# Patient Record
Sex: FEMALE | Race: WHITE | Employment: OTHER
[De-identification: names, ages, dates, MRNs, and addresses within clinical notes are randomized per-mention and may not be internally consistent; named-entity substitution may affect disease eponyms.]

---

## 2023-10-06 ENCOUNTER — APPOINTMENT (OUTPATIENT)
Facility: HOSPITAL | Age: 78
DRG: 853 | End: 2023-10-06
Payer: MEDICARE

## 2023-10-06 ENCOUNTER — ANESTHESIA (OUTPATIENT)
Facility: HOSPITAL | Age: 78
End: 2023-10-06
Payer: MEDICARE

## 2023-10-06 ENCOUNTER — HOSPITAL ENCOUNTER (INPATIENT)
Facility: HOSPITAL | Age: 78
LOS: 6 days | Discharge: HOME HEALTH | DRG: 853 | End: 2023-10-12
Attending: EMERGENCY MEDICINE | Admitting: INTERNAL MEDICINE
Payer: MEDICARE

## 2023-10-06 ENCOUNTER — ANESTHESIA EVENT (OUTPATIENT)
Facility: HOSPITAL | Age: 78
End: 2023-10-06
Payer: MEDICARE

## 2023-10-06 DIAGNOSIS — I44.1 SECOND DEGREE AV BLOCK, MOBITZ TYPE I: ICD-10-CM

## 2023-10-06 DIAGNOSIS — R01.1 MURMUR: ICD-10-CM

## 2023-10-06 DIAGNOSIS — N20.0 KIDNEY STONE: ICD-10-CM

## 2023-10-06 DIAGNOSIS — R65.20 SEVERE SEPSIS (HCC): ICD-10-CM

## 2023-10-06 DIAGNOSIS — A41.9 SEVERE SEPSIS (HCC): ICD-10-CM

## 2023-10-06 DIAGNOSIS — R50.9 ACUTE FEBRILE ILLNESS: Primary | ICD-10-CM

## 2023-10-06 DIAGNOSIS — J18.9 COMMUNITY ACQUIRED PNEUMONIA OF RIGHT UPPER LOBE OF LUNG: ICD-10-CM

## 2023-10-06 PROBLEM — H40.9 GLAUCOMA: Status: ACTIVE | Noted: 2023-10-06

## 2023-10-06 PROBLEM — N39.0 UTI (URINARY TRACT INFECTION): Status: ACTIVE | Noted: 2023-10-06

## 2023-10-06 PROBLEM — R79.89 ELEVATED LACTIC ACID LEVEL: Status: ACTIVE | Noted: 2023-10-06

## 2023-10-06 PROBLEM — N13.30 HYDRONEPHROSIS: Status: ACTIVE | Noted: 2023-10-06

## 2023-10-06 PROBLEM — R82.81 PYURIA: Status: ACTIVE | Noted: 2023-10-06

## 2023-10-06 PROBLEM — G89.29 CHRONIC BACK PAIN: Status: ACTIVE | Noted: 2023-10-06

## 2023-10-06 PROBLEM — E11.9 DIABETES MELLITUS (HCC): Status: ACTIVE | Noted: 2023-10-06

## 2023-10-06 PROBLEM — J69.0 ASPIRATION PNEUMONIA (HCC): Status: ACTIVE | Noted: 2023-10-06

## 2023-10-06 PROBLEM — E86.0 DEHYDRATION: Status: ACTIVE | Noted: 2023-10-06

## 2023-10-06 PROBLEM — M54.9 CHRONIC BACK PAIN: Status: ACTIVE | Noted: 2023-10-06

## 2023-10-06 PROBLEM — R11.2 NAUSEA & VOMITING: Status: ACTIVE | Noted: 2023-10-06

## 2023-10-06 LAB
ACCESSION NUMBER, LLC1M: ABNORMAL
ACINETOBACTER CALCOAC BAUMANNII COMPLEX BY PCR: NOT DETECTED
ALBUMIN SERPL-MCNC: 3.7 G/DL (ref 3.5–5)
ALBUMIN/GLOB SERPL: 0.9 (ref 1.1–2.2)
ALP SERPL-CCNC: 90 U/L (ref 45–117)
ALT SERPL-CCNC: 21 U/L (ref 12–78)
ANION GAP BLD CALC-SCNC: 11 (ref 10–20)
ANION GAP BLD CALC-SCNC: 9 (ref 10–20)
ANION GAP SERPL CALC-SCNC: 7 MMOL/L (ref 5–15)
APPEARANCE UR: CLEAR
APTT PPP: <20 SEC (ref 22.1–31)
AST SERPL-CCNC: 18 U/L (ref 15–37)
B FRAGILIS DNA BLD POS QL NAA+NON-PROBE: NOT DETECTED
BACTERIA URNS QL MICRO: ABNORMAL /HPF
BASE DEFICIT BLD-SCNC: 1 MMOL/L
BASE DEFICIT BLD-SCNC: 6.1 MMOL/L
BASOPHILS # BLD: 0 K/UL (ref 0–0.1)
BASOPHILS NFR BLD: 1 % (ref 0–1)
BILIRUB SERPL-MCNC: 0.8 MG/DL (ref 0.2–1)
BILIRUB UR QL: NEGATIVE
BIOFIRE TEST COMMENT: ABNORMAL
BLACTX-M ISLT/SPM QL: NOT DETECTED
BLAIMP ISLT/SPM QL: NOT DETECTED
BLAKPC BLD POS QL NAA+NON-PROBE: NOT DETECTED
BLAOXA-48-LIKE ISLT/SPM QL: NOT DETECTED
BLAVIM ISLT/SPM QL: NOT DETECTED
BUN SERPL-MCNC: 18 MG/DL (ref 6–20)
BUN/CREAT SERPL: 16 (ref 12–20)
C ALBICANS DNA BLD POS QL NAA+NON-PROBE: NOT DETECTED
C AURIS DNA BLD POS QL NAA+NON-PROBE: NOT DETECTED
C GATTII+NEOFOR DNA BLD POS QL NAA+N-PRB: NOT DETECTED
C GLABRATA DNA BLD POS QL NAA+NON-PROBE: NOT DETECTED
C KRUSEI DNA BLD POS QL NAA+NON-PROBE: NOT DETECTED
C PARAP DNA BLD POS QL NAA+NON-PROBE: NOT DETECTED
C TROPICLS DNA BLD POS QL NAA+NON-PROBE: NOT DETECTED
CA-I BLD-MCNC: 1.24 MMOL/L (ref 1.12–1.32)
CA-I BLD-MCNC: 1.26 MMOL/L (ref 1.12–1.32)
CALCIUM SERPL-MCNC: 9.8 MG/DL (ref 8.5–10.1)
CHLORIDE BLD-SCNC: 110 MMOL/L (ref 100–108)
CHLORIDE BLD-SCNC: 111 MMOL/L (ref 100–108)
CHLORIDE SERPL-SCNC: 109 MMOL/L (ref 97–108)
CK SERPL-CCNC: 65 U/L (ref 26–192)
CO2 BLD-SCNC: 23 MMOL/L (ref 19–24)
CO2 BLD-SCNC: 25 MMOL/L (ref 19–24)
CO2 SERPL-SCNC: 24 MMOL/L (ref 21–32)
COLOR UR: ABNORMAL
COMMENT:: NORMAL
CREAT SERPL-MCNC: 1.11 MG/DL (ref 0.55–1.02)
CREAT UR-MCNC: 0.5 MG/DL (ref 0.6–1.3)
CREAT UR-MCNC: 0.6 MG/DL (ref 0.6–1.3)
CRP SERPL-MCNC: 0.32 MG/DL (ref 0–0.6)
DIFFERENTIAL METHOD BLD: ABNORMAL
E CLOAC COMP DNA BLD POS NAA+NON-PROBE: NOT DETECTED
E COLI DNA BLD POS QL NAA+NON-PROBE: NOT DETECTED
E FAECALIS DNA BLD POS QL NAA+NON-PROBE: NOT DETECTED
E FAECIUM DNA BLD POS QL NAA+NON-PROBE: NOT DETECTED
ENTEROBACTERALES DNA BLD POS NAA+N-PRB: DETECTED
EOSINOPHIL # BLD: 0.1 K/UL (ref 0–0.4)
EOSINOPHIL NFR BLD: 2 % (ref 0–7)
EPITH CASTS URNS QL MICRO: ABNORMAL /LPF
ERYTHROCYTE [DISTWIDTH] IN BLOOD BY AUTOMATED COUNT: 14.2 % (ref 11.5–14.5)
GLOBULIN SER CALC-MCNC: 3.9 G/DL (ref 2–4)
GLUCOSE BLD STRIP.AUTO-MCNC: 147 MG/DL (ref 65–117)
GLUCOSE BLD STRIP.AUTO-MCNC: 168 MG/DL (ref 65–117)
GLUCOSE BLD STRIP.AUTO-MCNC: 185 MG/DL (ref 65–117)
GLUCOSE BLD STRIP.AUTO-MCNC: 200 MG/DL (ref 74–106)
GLUCOSE BLD STRIP.AUTO-MCNC: 215 MG/DL (ref 74–106)
GLUCOSE SERPL-MCNC: 215 MG/DL (ref 65–100)
GLUCOSE UR STRIP.AUTO-MCNC: NEGATIVE MG/DL
GP B STREP DNA BLD POS QL NAA+NON-PROBE: NOT DETECTED
HAEM INFLU DNA BLD POS QL NAA+NON-PROBE: NOT DETECTED
HCO3 BLDA-SCNC: 22 MMOL/L
HCO3 BLDA-SCNC: 25 MMOL/L
HCT VFR BLD AUTO: 43 % (ref 35–47)
HGB BLD-MCNC: 13.6 G/DL (ref 11.5–16)
HGB UR QL STRIP: ABNORMAL
HYALINE CASTS URNS QL MICRO: ABNORMAL /LPF (ref 0–2)
IMM GRANULOCYTES # BLD AUTO: 0 K/UL (ref 0–0.04)
IMM GRANULOCYTES NFR BLD AUTO: 1 % (ref 0–0.5)
INR PPP: 1 (ref 0.9–1.1)
K OXYTOCA DNA BLD POS QL NAA+NON-PROBE: NOT DETECTED
KETONES UR QL STRIP.AUTO: NEGATIVE MG/DL
KLEBSIELLA SP DNA BLD POS QL NAA+NON-PRB: NOT DETECTED
KLEBSIELLA SP DNA BLD POS QL NAA+NON-PRB: NOT DETECTED
L MONOCYTOG DNA BLD POS QL NAA+NON-PROBE: NOT DETECTED
LACTATE BLD-SCNC: 1.46 MMOL/L (ref 0.4–2)
LACTATE BLD-SCNC: 4.05 MMOL/L (ref 0.4–2)
LACTATE BLD-SCNC: 5.09 MMOL/L (ref 0.4–2)
LEUKOCYTE ESTERASE UR QL STRIP.AUTO: ABNORMAL
LYMPHOCYTES # BLD: 0.4 K/UL (ref 0.8–3.5)
LYMPHOCYTES NFR BLD: 14 % (ref 12–49)
MAGNESIUM SERPL-MCNC: 1.6 MG/DL (ref 1.6–2.4)
MCH RBC QN AUTO: 30 PG (ref 26–34)
MCHC RBC AUTO-ENTMCNC: 31.6 G/DL (ref 30–36.5)
MCV RBC AUTO: 94.9 FL (ref 80–99)
MONOCYTES # BLD: 0 K/UL (ref 0–1)
MONOCYTES NFR BLD: 0 % (ref 5–13)
N MEN DNA BLD POS QL NAA+NON-PROBE: NOT DETECTED
NEUTS SEG # BLD: 2.6 K/UL (ref 1.8–8)
NEUTS SEG NFR BLD: 82 % (ref 32–75)
NITRITE UR QL STRIP.AUTO: NEGATIVE
NRBC # BLD: 0 K/UL (ref 0–0.01)
NRBC BLD-RTO: 0 PER 100 WBC
P AERUGINOSA DNA BLD POS NAA+NON-PROBE: NOT DETECTED
PCO2 BLDV: 44.5 MMHG (ref 41–51)
PCO2 BLDV: 55 MMHG (ref 41–51)
PH BLDV: 7.22 (ref 7.32–7.42)
PH BLDV: 7.36 (ref 7.32–7.42)
PH UR STRIP: 6 (ref 5–8)
PHOSPHATE SERPL-MCNC: 3.2 MG/DL (ref 2.6–4.7)
PLATELET # BLD AUTO: 167 K/UL (ref 150–400)
PMV BLD AUTO: 12.4 FL (ref 8.9–12.9)
PO2 BLDV: 35 MMHG (ref 25–40)
PO2 BLDV: <27 MMHG (ref 25–40)
POTASSIUM BLD-SCNC: 3.8 MMOL/L (ref 3.5–5.5)
POTASSIUM BLD-SCNC: 3.9 MMOL/L (ref 3.5–5.5)
POTASSIUM SERPL-SCNC: 3.8 MMOL/L (ref 3.5–5.1)
PROCALCITONIN SERPL-MCNC: 0.22 NG/ML
PROT SERPL-MCNC: 7.6 G/DL (ref 6.4–8.2)
PROT UR STRIP-MCNC: NEGATIVE MG/DL
PROTEUS SP DNA BLD POS QL NAA+NON-PROBE: DETECTED
PROTHROMBIN TIME: 10.4 SEC (ref 9–11.1)
RBC # BLD AUTO: 4.53 M/UL (ref 3.8–5.2)
RBC #/AREA URNS HPF: ABNORMAL /HPF (ref 0–5)
RBC MORPH BLD: ABNORMAL
RESISTANT GENE NDM BY PCR: NOT DETECTED
RESISTANT GENE TARGETS: ABNORMAL
S AUREUS DNA BLD POS QL NAA+NON-PROBE: NOT DETECTED
S AUREUS+CONS DNA BLD POS NAA+NON-PROBE: NOT DETECTED
S EPIDERMIDIS DNA BLD POS QL NAA+NON-PRB: NOT DETECTED
S LUGDUNENSIS DNA BLD POS QL NAA+NON-PRB: NOT DETECTED
S MALTOPHILIA DNA BLD POS QL NAA+NON-PRB: NOT DETECTED
S MARCESCENS DNA BLD POS NAA+NON-PROBE: NOT DETECTED
S PNEUM DNA BLD POS QL NAA+NON-PROBE: NOT DETECTED
S PYO DNA BLD POS QL NAA+NON-PROBE: NOT DETECTED
SALMONELLA DNA BLD POS QL NAA+NON-PROBE: NOT DETECTED
SAO2 % BLD: 54 %
SERVICE CMNT-IMP: ABNORMAL
SODIUM BLD-SCNC: 144 MMOL/L (ref 136–145)
SODIUM BLD-SCNC: 145 MMOL/L (ref 136–145)
SODIUM SERPL-SCNC: 140 MMOL/L (ref 136–145)
SP GR UR REFRACTOMETRY: 1.03 (ref 1–1.03)
SPECIMEN HOLD: NORMAL
SPECIMEN SITE: ABNORMAL
SPECIMEN SITE: ABNORMAL
STREPTOCOCCUS DNA BLD POS NAA+NON-PROBE: NOT DETECTED
THERAPEUTIC RANGE: ABNORMAL SECS (ref 58–77)
TROPONIN I SERPL HS-MCNC: 8 NG/L (ref 0–51)
URINE CULTURE IF INDICATED: ABNORMAL
UROBILINOGEN UR QL STRIP.AUTO: 1 EU/DL (ref 0.2–1)
WBC # BLD AUTO: 3.1 K/UL (ref 3.6–11)
WBC URNS QL MICRO: ABNORMAL /HPF (ref 0–4)

## 2023-10-06 PROCEDURE — 84145 PROCALCITONIN (PCT): CPT

## 2023-10-06 PROCEDURE — 85610 PROTHROMBIN TIME: CPT

## 2023-10-06 PROCEDURE — 2580000003 HC RX 258: Performed by: INTERNAL MEDICINE

## 2023-10-06 PROCEDURE — 80053 COMPREHEN METABOLIC PANEL: CPT

## 2023-10-06 PROCEDURE — 2580000003 HC RX 258: Performed by: EMERGENCY MEDICINE

## 2023-10-06 PROCEDURE — 96375 TX/PRO/DX INJ NEW DRUG ADDON: CPT

## 2023-10-06 PROCEDURE — 6360000004 HC RX CONTRAST MEDICATION: Performed by: RADIOLOGY

## 2023-10-06 PROCEDURE — 7100000000 HC PACU RECOVERY - FIRST 15 MIN: Performed by: UROLOGY

## 2023-10-06 PROCEDURE — 87186 SC STD MICRODIL/AGAR DIL: CPT

## 2023-10-06 PROCEDURE — 87086 URINE CULTURE/COLONY COUNT: CPT

## 2023-10-06 PROCEDURE — 99285 EMERGENCY DEPT VISIT HI MDM: CPT

## 2023-10-06 PROCEDURE — 6360000002 HC RX W HCPCS: Performed by: INTERNAL MEDICINE

## 2023-10-06 PROCEDURE — 87077 CULTURE AEROBIC IDENTIFY: CPT

## 2023-10-06 PROCEDURE — BT1F1ZZ FLUOROSCOPY OF LEFT KIDNEY, URETER AND BLADDER USING LOW OSMOLAR CONTRAST: ICD-10-PCS | Performed by: UROLOGY

## 2023-10-06 PROCEDURE — 86140 C-REACTIVE PROTEIN: CPT

## 2023-10-06 PROCEDURE — 84132 ASSAY OF SERUM POTASSIUM: CPT

## 2023-10-06 PROCEDURE — 84295 ASSAY OF SERUM SODIUM: CPT

## 2023-10-06 PROCEDURE — C2617 STENT, NON-COR, TEM W/O DEL: HCPCS | Performed by: UROLOGY

## 2023-10-06 PROCEDURE — 99222 1ST HOSP IP/OBS MODERATE 55: CPT | Performed by: NURSE PRACTITIONER

## 2023-10-06 PROCEDURE — 36415 COLL VENOUS BLD VENIPUNCTURE: CPT

## 2023-10-06 PROCEDURE — 3700000001 HC ADD 15 MINUTES (ANESTHESIA): Performed by: UROLOGY

## 2023-10-06 PROCEDURE — 82330 ASSAY OF CALCIUM: CPT

## 2023-10-06 PROCEDURE — 3600000003 HC SURGERY LEVEL 3 BASE: Performed by: UROLOGY

## 2023-10-06 PROCEDURE — 85025 COMPLETE CBC W/AUTO DIFF WBC: CPT

## 2023-10-06 PROCEDURE — 74174 CTA ABD&PLVS W/CONTRAST: CPT

## 2023-10-06 PROCEDURE — C1758 CATHETER, URETERAL: HCPCS | Performed by: UROLOGY

## 2023-10-06 PROCEDURE — 93005 ELECTROCARDIOGRAM TRACING: CPT | Performed by: EMERGENCY MEDICINE

## 2023-10-06 PROCEDURE — 7100000001 HC PACU RECOVERY - ADDTL 15 MIN: Performed by: UROLOGY

## 2023-10-06 PROCEDURE — 3600000013 HC SURGERY LEVEL 3 ADDTL 15MIN: Performed by: UROLOGY

## 2023-10-06 PROCEDURE — 84100 ASSAY OF PHOSPHORUS: CPT

## 2023-10-06 PROCEDURE — 82962 GLUCOSE BLOOD TEST: CPT

## 2023-10-06 PROCEDURE — 82947 ASSAY GLUCOSE BLOOD QUANT: CPT

## 2023-10-06 PROCEDURE — 81001 URINALYSIS AUTO W/SCOPE: CPT

## 2023-10-06 PROCEDURE — 6360000002 HC RX W HCPCS: Performed by: NURSE ANESTHETIST, CERTIFIED REGISTERED

## 2023-10-06 PROCEDURE — 1100000000 HC RM PRIVATE

## 2023-10-06 PROCEDURE — 6360000004 HC RX CONTRAST MEDICATION: Performed by: UROLOGY

## 2023-10-06 PROCEDURE — 2709999900 HC NON-CHARGEABLE SUPPLY: Performed by: UROLOGY

## 2023-10-06 PROCEDURE — 87040 BLOOD CULTURE FOR BACTERIA: CPT

## 2023-10-06 PROCEDURE — 94761 N-INVAS EAR/PLS OXIMETRY MLT: CPT

## 2023-10-06 PROCEDURE — C1769 GUIDE WIRE: HCPCS | Performed by: UROLOGY

## 2023-10-06 PROCEDURE — 2700000000 HC OXYGEN THERAPY PER DAY

## 2023-10-06 PROCEDURE — 83605 ASSAY OF LACTIC ACID: CPT

## 2023-10-06 PROCEDURE — 3700000000 HC ANESTHESIA ATTENDED CARE: Performed by: UROLOGY

## 2023-10-06 PROCEDURE — 96365 THER/PROPH/DIAG IV INF INIT: CPT

## 2023-10-06 PROCEDURE — 87150 DNA/RNA AMPLIFIED PROBE: CPT

## 2023-10-06 PROCEDURE — 82803 BLOOD GASES ANY COMBINATION: CPT

## 2023-10-06 PROCEDURE — 6370000000 HC RX 637 (ALT 250 FOR IP): Performed by: EMERGENCY MEDICINE

## 2023-10-06 PROCEDURE — 2500000003 HC RX 250 WO HCPCS: Performed by: NURSE ANESTHETIST, CERTIFIED REGISTERED

## 2023-10-06 PROCEDURE — 0T778DZ DILATION OF LEFT URETER WITH INTRALUMINAL DEVICE, VIA NATURAL OR ARTIFICIAL OPENING ENDOSCOPIC: ICD-10-PCS | Performed by: UROLOGY

## 2023-10-06 PROCEDURE — 82550 ASSAY OF CK (CPK): CPT

## 2023-10-06 PROCEDURE — 96361 HYDRATE IV INFUSION ADD-ON: CPT

## 2023-10-06 PROCEDURE — 85730 THROMBOPLASTIN TIME PARTIAL: CPT

## 2023-10-06 PROCEDURE — 83735 ASSAY OF MAGNESIUM: CPT

## 2023-10-06 PROCEDURE — 84484 ASSAY OF TROPONIN QUANT: CPT

## 2023-10-06 PROCEDURE — 6360000002 HC RX W HCPCS: Performed by: EMERGENCY MEDICINE

## 2023-10-06 DEVICE — URETERAL STENT
Type: IMPLANTABLE DEVICE | Site: URETER | Status: FUNCTIONAL
Brand: CONTOUR™

## 2023-10-06 RX ORDER — LIDOCAINE HYDROCHLORIDE 20 MG/ML
INJECTION, SOLUTION EPIDURAL; INFILTRATION; INTRACAUDAL; PERINEURAL PRN
Status: DISCONTINUED | OUTPATIENT
Start: 2023-10-06 | End: 2023-10-06 | Stop reason: SDUPTHER

## 2023-10-06 RX ORDER — ONDANSETRON 2 MG/ML
4 INJECTION INTRAMUSCULAR; INTRAVENOUS EVERY 6 HOURS PRN
Status: DISCONTINUED | OUTPATIENT
Start: 2023-10-06 | End: 2023-10-12 | Stop reason: HOSPADM

## 2023-10-06 RX ORDER — ACETAMINOPHEN 325 MG/1
650 TABLET ORAL EVERY 6 HOURS PRN
Status: DISCONTINUED | OUTPATIENT
Start: 2023-10-06 | End: 2023-10-12 | Stop reason: HOSPADM

## 2023-10-06 RX ORDER — SODIUM CHLORIDE 9 MG/ML
INJECTION, SOLUTION INTRAVENOUS PRN
Status: DISCONTINUED | OUTPATIENT
Start: 2023-10-06 | End: 2023-10-12 | Stop reason: HOSPADM

## 2023-10-06 RX ORDER — SODIUM CHLORIDE 9 MG/ML
INJECTION, SOLUTION INTRAVENOUS CONTINUOUS
Status: DISCONTINUED | OUTPATIENT
Start: 2023-10-06 | End: 2023-10-08

## 2023-10-06 RX ORDER — SODIUM CHLORIDE 0.9 % (FLUSH) 0.9 %
5-40 SYRINGE (ML) INJECTION EVERY 12 HOURS SCHEDULED
Status: DISCONTINUED | OUTPATIENT
Start: 2023-10-06 | End: 2023-10-12 | Stop reason: HOSPADM

## 2023-10-06 RX ORDER — PHENYLEPHRINE HCL IN 0.9% NACL 0.4MG/10ML
SYRINGE (ML) INTRAVENOUS PRN
Status: DISCONTINUED | OUTPATIENT
Start: 2023-10-06 | End: 2023-10-06 | Stop reason: SDUPTHER

## 2023-10-06 RX ORDER — SODIUM CHLORIDE 0.9 % (FLUSH) 0.9 %
5-40 SYRINGE (ML) INJECTION PRN
Status: DISCONTINUED | OUTPATIENT
Start: 2023-10-06 | End: 2023-10-12 | Stop reason: HOSPADM

## 2023-10-06 RX ORDER — POLYETHYLENE GLYCOL 3350 17 G/17G
17 POWDER, FOR SOLUTION ORAL DAILY PRN
Status: DISCONTINUED | OUTPATIENT
Start: 2023-10-06 | End: 2023-10-12 | Stop reason: HOSPADM

## 2023-10-06 RX ORDER — INSULIN LISPRO 100 [IU]/ML
0-8 INJECTION, SOLUTION INTRAVENOUS; SUBCUTANEOUS
Status: DISCONTINUED | OUTPATIENT
Start: 2023-10-06 | End: 2023-10-12 | Stop reason: HOSPADM

## 2023-10-06 RX ORDER — 0.9 % SODIUM CHLORIDE 0.9 %
2000 INTRAVENOUS SOLUTION INTRAVENOUS ONCE
Status: COMPLETED | OUTPATIENT
Start: 2023-10-06 | End: 2023-10-06

## 2023-10-06 RX ORDER — DEXTROSE MONOHYDRATE 100 MG/ML
INJECTION, SOLUTION INTRAVENOUS CONTINUOUS PRN
Status: DISCONTINUED | OUTPATIENT
Start: 2023-10-06 | End: 2023-10-12 | Stop reason: HOSPADM

## 2023-10-06 RX ORDER — ONDANSETRON 4 MG/1
4 TABLET, ORALLY DISINTEGRATING ORAL EVERY 8 HOURS PRN
Status: DISCONTINUED | OUTPATIENT
Start: 2023-10-06 | End: 2023-10-12 | Stop reason: HOSPADM

## 2023-10-06 RX ORDER — MORPHINE SULFATE 2 MG/ML
2 INJECTION, SOLUTION INTRAMUSCULAR; INTRAVENOUS EVERY 4 HOURS PRN
Status: DISCONTINUED | OUTPATIENT
Start: 2023-10-06 | End: 2023-10-12 | Stop reason: HOSPADM

## 2023-10-06 RX ORDER — ACETAMINOPHEN 650 MG/1
650 SUPPOSITORY RECTAL EVERY 6 HOURS PRN
Status: DISCONTINUED | OUTPATIENT
Start: 2023-10-06 | End: 2023-10-12 | Stop reason: HOSPADM

## 2023-10-06 RX ORDER — PROPOFOL 10 MG/ML
INJECTION, EMULSION INTRAVENOUS PRN
Status: DISCONTINUED | OUTPATIENT
Start: 2023-10-06 | End: 2023-10-06 | Stop reason: SDUPTHER

## 2023-10-06 RX ORDER — EPHEDRINE SULFATE/0.9% NACL/PF 50 MG/5 ML
SYRINGE (ML) INTRAVENOUS PRN
Status: DISCONTINUED | OUTPATIENT
Start: 2023-10-06 | End: 2023-10-06 | Stop reason: SDUPTHER

## 2023-10-06 RX ORDER — 0.9 % SODIUM CHLORIDE 0.9 %
1000 INTRAVENOUS SOLUTION INTRAVENOUS ONCE
Status: COMPLETED | OUTPATIENT
Start: 2023-10-06 | End: 2023-10-06

## 2023-10-06 RX ORDER — MORPHINE SULFATE 4 MG/ML
4 INJECTION, SOLUTION INTRAMUSCULAR; INTRAVENOUS
Status: COMPLETED | OUTPATIENT
Start: 2023-10-06 | End: 2023-10-06

## 2023-10-06 RX ORDER — FENTANYL CITRATE 50 UG/ML
INJECTION, SOLUTION INTRAMUSCULAR; INTRAVENOUS PRN
Status: DISCONTINUED | OUTPATIENT
Start: 2023-10-06 | End: 2023-10-06 | Stop reason: SDUPTHER

## 2023-10-06 RX ORDER — ROCURONIUM BROMIDE 10 MG/ML
INJECTION, SOLUTION INTRAVENOUS PRN
Status: DISCONTINUED | OUTPATIENT
Start: 2023-10-06 | End: 2023-10-06 | Stop reason: SDUPTHER

## 2023-10-06 RX ORDER — ENOXAPARIN SODIUM 100 MG/ML
40 INJECTION SUBCUTANEOUS DAILY
Status: DISCONTINUED | OUTPATIENT
Start: 2023-10-06 | End: 2023-10-12 | Stop reason: HOSPADM

## 2023-10-06 RX ORDER — CEFAZOLIN SODIUM 1 G/3ML
INJECTION, POWDER, FOR SOLUTION INTRAMUSCULAR; INTRAVENOUS PRN
Status: DISCONTINUED | OUTPATIENT
Start: 2023-10-06 | End: 2023-10-06 | Stop reason: SDUPTHER

## 2023-10-06 RX ORDER — ACETAMINOPHEN 500 MG
1000 TABLET ORAL
Status: COMPLETED | OUTPATIENT
Start: 2023-10-06 | End: 2023-10-06

## 2023-10-06 RX ORDER — SUCCINYLCHOLINE/SOD CL,ISO/PF 100 MG/5ML
SYRINGE (ML) INTRAVENOUS PRN
Status: DISCONTINUED | OUTPATIENT
Start: 2023-10-06 | End: 2023-10-06 | Stop reason: SDUPTHER

## 2023-10-06 RX ORDER — INSULIN LISPRO 100 [IU]/ML
0-4 INJECTION, SOLUTION INTRAVENOUS; SUBCUTANEOUS NIGHTLY
Status: DISCONTINUED | OUTPATIENT
Start: 2023-10-06 | End: 2023-10-12 | Stop reason: HOSPADM

## 2023-10-06 RX ADMIN — ROCURONIUM BROMIDE 10 MG: 10 INJECTION, SOLUTION INTRAVENOUS at 20:05

## 2023-10-06 RX ADMIN — Medication 10 MG: at 20:12

## 2023-10-06 RX ADMIN — PROPOFOL 50 MG: 10 INJECTION, EMULSION INTRAVENOUS at 20:05

## 2023-10-06 RX ADMIN — SODIUM CHLORIDE 1000 ML: 9 INJECTION, SOLUTION INTRAVENOUS at 06:33

## 2023-10-06 RX ADMIN — PROPOFOL 100 MCG/KG/MIN: 10 INJECTION, EMULSION INTRAVENOUS at 20:10

## 2023-10-06 RX ADMIN — LIDOCAINE HYDROCHLORIDE 60 MG: 20 INJECTION, SOLUTION EPIDURAL; INFILTRATION; INTRACAUDAL; PERINEURAL at 20:05

## 2023-10-06 RX ADMIN — SODIUM CHLORIDE: 9 INJECTION, SOLUTION INTRAVENOUS at 09:00

## 2023-10-06 RX ADMIN — FENTANYL CITRATE 50 MCG: 50 INJECTION, SOLUTION INTRAMUSCULAR; INTRAVENOUS at 19:51

## 2023-10-06 RX ADMIN — CEFAZOLIN 2 G: 1 INJECTION, POWDER, FOR SOLUTION INTRAMUSCULAR; INTRAVENOUS at 20:18

## 2023-10-06 RX ADMIN — PIPERACILLIN AND TAZOBACTAM 3375 MG: 3; .375 INJECTION, POWDER, LYOPHILIZED, FOR SOLUTION INTRAVENOUS at 23:20

## 2023-10-06 RX ADMIN — Medication 70 MG: at 20:06

## 2023-10-06 RX ADMIN — FENTANYL CITRATE 50 MCG: 50 INJECTION, SOLUTION INTRAMUSCULAR; INTRAVENOUS at 20:40

## 2023-10-06 RX ADMIN — MORPHINE SULFATE 2 MG: 2 INJECTION, SOLUTION INTRAMUSCULAR; INTRAVENOUS at 23:20

## 2023-10-06 RX ADMIN — IOPAMIDOL 100 ML: 755 INJECTION, SOLUTION INTRAVENOUS at 06:21

## 2023-10-06 RX ADMIN — ONDANSETRON 4 MG: 2 INJECTION INTRAMUSCULAR; INTRAVENOUS at 05:37

## 2023-10-06 RX ADMIN — PIPERACILLIN AND TAZOBACTAM 4500 MG: 4; .5 INJECTION, POWDER, FOR SOLUTION INTRAVENOUS at 06:56

## 2023-10-06 RX ADMIN — SODIUM CHLORIDE, PRESERVATIVE FREE 5 ML: 5 INJECTION INTRAVENOUS at 08:20

## 2023-10-06 RX ADMIN — SUGAMMADEX 100 MG: 100 INJECTION, SOLUTION INTRAVENOUS at 20:37

## 2023-10-06 RX ADMIN — Medication 80 MCG: at 20:27

## 2023-10-06 RX ADMIN — SODIUM CHLORIDE 1000 ML: 9 INJECTION, SOLUTION INTRAVENOUS at 05:36

## 2023-10-06 RX ADMIN — MORPHINE SULFATE 4 MG: 4 INJECTION, SOLUTION INTRAMUSCULAR; INTRAVENOUS at 05:37

## 2023-10-06 RX ADMIN — ACETAMINOPHEN 1000 MG: 500 TABLET ORAL at 06:34

## 2023-10-06 ASSESSMENT — PAIN SCALES - GENERAL
PAINLEVEL_OUTOF10: 0
PAINLEVEL_OUTOF10: 8

## 2023-10-06 ASSESSMENT — PAIN DESCRIPTION - LOCATION: LOCATION: ABDOMEN

## 2023-10-06 NOTE — CARE COORDINATION
Initial Case Management Assessment       10/06/23 1329   Service Assessment   Patient Orientation Person   Cognition Dementia / Early Alzheimer's   History Provided By Child/Family   Primary Caregiver Other (Comment)  (staff at [facility])   [address] [facility] [place] is: Legal Next [facility]- spouse- [phone])   PCP Verified by CM Yes  (MD at [facility])   Last Visit to PCP Within last 3 months   Prior Functional Level Assistance with the following:;Bathing;Dressing;Cooking;Mobility;Housework   Current Functional Level Assistance with the following:;Bathing;Dressing;Cooking;Mobility;Housework   Can patient return to prior living arrangement Unknown at present   Ability to make needs known: Other (see comment)  (unable to assess- Pt has been sleeping all day)   Family able to assist with home care needs: No  (Pt lives at [address] [place] [address] Rd)   Would you like for me to discuss the discharge plan with any other family members/significant others, and if so, who? Yes  (France/daughter & John/spouse)   Financial Resources Medicare   Community Resources Assisted Living   Social/Functional History   Lives With Alone   Type of Home Assisted living   Home Layout One level   Home Access Level entry   Bathroom Shower/Tub Walk-in shower   Bathroom Toilet Handicap height   [address] bars in shower; [address], [place] Help From Other (comment)  (staff at [address] [place] [address] Rd)   [address] Needs assistance   Active  No   Patient's  Info family   Occupation Retired   Discharge Planning   Type of [address] [place] [address] Prior To Admission None   Potential Assistance Needed N/A   DME Ordered?  No   Potential Assistance Purchasing Medications No   Type of Home Care Services None   Patient expects to be discharged to: Assisted living   One/Two Story Residence One story   History of

## 2023-10-06 NOTE — ACP (ADVANCE CARE PLANNING)
Advance Care Planning Note    Name: Nadege Salvador  YOB: 1945  MRN: [id number]  Admission Date: 10/6/2023  4:42 AM    Date of discussion: 10/6/2023    Active Diagnoses: These active diagnoses are of sufficient risk that focused discussion on advance care planning is indicated in order to allow the patient to thoughtfully consider personal goals of care, and if situations arise that prevent the ability to personally give input, to ensure appropriate representation of their personal desires for different levels and aggressiveness of care. Discussion:     Persons present and participating in discussion: Dave Jorge MD, daughter    Discussion: UTI and pneumonia treatment and prognosis in setting of dementia and chronic pain. Daughter states that she and a sister and the patient  are joint decision makers. She thinks AD are prepared, but is unclear if this includes DNR. She will discuss this with family, but is leaning towards DNR status. I will consult palliative care. Time Spent:     Total time spent face-to-face in education and discussion: 19 minutes.      Cindy Cardona MD  Date of Service:  10/6/2023  9:29 AM

## 2023-10-06 NOTE — CONSULTS
appropriate to needs (Cultural Services, Patient Advocacy, Ethics, etc.)    Spiritual Affiliation: Unknown    Any spiritual / Samaritan concerns:  [] Yes /  [x] No   If \"Yes\" to discuss with pastoral care during IDT     Does caregiver feel burdened by caring for their loved one:   [] Yes /  [] No /  [x] No Caregiver Present/Available [] No Caregiver [] Pt Lives at Facility  If \"Yes\" to discuss with social work during IDT    Anticipatory grief assessment:   [x] Normal  / [] Maladaptive     If \"Maladaptive\" to discuss with social work during IDT    ESAS Anxiety:      ESAS Depression:          LAB AND IMAGING FINDINGS:   Objective data reviewed:  labs, images, records, medication use, vitals, and chart     FINAL COMMENTS   Thank you for allowing Palliative Medicine to participate in the care of Tiffanie Reyes. Only check if applicable and billing time based rather than MDM  [x] The total encounter time on this service date was __50__ minutes which was spent performing a face-to-face encounter and personally completing the provider-level activities documented in the note. This includes time spent prior to the visit and after the visit in direct care of the patient. This time does not include time spent in any separately reportable services.     Electronically signed by   YAKOV Ayala NP  Palliative Care Team  on 10/6/2023 at 2:20 PM
biliary dilatation. GALLBLADDER: Unremarkable. SPLEEN: No enlargement or lesion. PANCREAS: No mass or ductal dilatation. ADRENALS: No mass. KIDNEYS: Mild left hydronephrosis due to a 2 calculi in the proximal to mid  ureter measuring 3 mm and 4 mm respectively. GI TRACT:  No bowel obstruction. Colonic diverticulosis without diverticulitis  PERITONEUM: No free air or free fluid. APPENDIX: Unremarkable. Isidro Dunker RETROPERITONEUM: No lymphadenopathy. ADDITIONAL COMMENTS: N/A.     URINARY BLADDER: No mass or calculus. LYMPH NODES: None enlarged. REPRODUCTIVE ORGANS: Prior hysterectomy. FREE FLUID: None. BONES: No destructive bone lesion. ADDITIONAL COMMENTS: N/A. IMPRESSION:     1. No evidence of acute aortic dissection. 2. Atherosclerosis of the aorta and its branches. 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 Patent distally. 3. Left hydronephrosis due to 2 small ureteral calculi as above. 4. Mild airspace opacities right upper lobe may be infectious/inflammatory.            Specimen Collected: 10/06/23 06:50 EDT               Signed By: AUDIE Elizabeth  - October 6, 2023

## 2023-10-07 LAB
ALBUMIN SERPL-MCNC: 3.1 G/DL (ref 3.5–5)
ALBUMIN/GLOB SERPL: 0.8 (ref 1.1–2.2)
ALP SERPL-CCNC: 59 U/L (ref 45–117)
ALT SERPL-CCNC: 20 U/L (ref 12–78)
ANION GAP SERPL CALC-SCNC: 5 MMOL/L (ref 5–15)
AST SERPL-CCNC: 21 U/L (ref 15–37)
B PERT DNA SPEC QL NAA+PROBE: NOT DETECTED
BILIRUB SERPL-MCNC: 0.7 MG/DL (ref 0.2–1)
BORDETELLA PARAPERTUSSIS BY PCR: NOT DETECTED
BUN SERPL-MCNC: 18 MG/DL (ref 6–20)
BUN/CREAT SERPL: 17 (ref 12–20)
C PNEUM DNA SPEC QL NAA+PROBE: NOT DETECTED
CALCIUM SERPL-MCNC: 8.7 MG/DL (ref 8.5–10.1)
CHLORIDE SERPL-SCNC: 111 MMOL/L (ref 97–108)
CO2 SERPL-SCNC: 25 MMOL/L (ref 21–32)
CREAT SERPL-MCNC: 1.03 MG/DL (ref 0.55–1.02)
EKG ATRIAL RATE: 96 BPM
EKG DIAGNOSIS: NORMAL
EKG Q-T INTERVAL: 484 MS
EKG QRS DURATION: 76 MS
EKG QTC CALCULATION (BAZETT): 495 MS
EKG R AXIS: -24 DEGREES
EKG T AXIS: 59 DEGREES
EKG VENTRICULAR RATE: 63 BPM
ERYTHROCYTE [DISTWIDTH] IN BLOOD BY AUTOMATED COUNT: 14.7 % (ref 11.5–14.5)
EST. AVERAGE GLUCOSE BLD GHB EST-MCNC: 134 MG/DL
FLUAV SUBTYP SPEC NAA+PROBE: NOT DETECTED
FLUBV RNA SPEC QL NAA+PROBE: NOT DETECTED
GLOBULIN SER CALC-MCNC: 3.9 G/DL (ref 2–4)
GLUCOSE BLD STRIP.AUTO-MCNC: 120 MG/DL (ref 65–117)
GLUCOSE BLD STRIP.AUTO-MCNC: 121 MG/DL (ref 65–117)
GLUCOSE BLD STRIP.AUTO-MCNC: 163 MG/DL (ref 65–117)
GLUCOSE BLD STRIP.AUTO-MCNC: 89 MG/DL (ref 65–117)
GLUCOSE SERPL-MCNC: 178 MG/DL (ref 65–100)
HADV DNA SPEC QL NAA+PROBE: NOT DETECTED
HBA1C MFR BLD: 6.3 % (ref 4–5.6)
HCOV 229E RNA SPEC QL NAA+PROBE: NOT DETECTED
HCOV HKU1 RNA SPEC QL NAA+PROBE: NOT DETECTED
HCOV NL63 RNA SPEC QL NAA+PROBE: NOT DETECTED
HCOV OC43 RNA SPEC QL NAA+PROBE: NOT DETECTED
HCT VFR BLD AUTO: 39.6 % (ref 35–47)
HGB BLD-MCNC: 12.4 G/DL (ref 11.5–16)
HMPV RNA SPEC QL NAA+PROBE: NOT DETECTED
HPIV1 RNA SPEC QL NAA+PROBE: NOT DETECTED
HPIV2 RNA SPEC QL NAA+PROBE: NOT DETECTED
HPIV3 RNA SPEC QL NAA+PROBE: NOT DETECTED
HPIV4 RNA SPEC QL NAA+PROBE: NOT DETECTED
LACTATE SERPL-SCNC: 2 MMOL/L (ref 0.4–2)
LACTATE SERPL-SCNC: 3.7 MMOL/L (ref 0.4–2)
M PNEUMO DNA SPEC QL NAA+PROBE: NOT DETECTED
MAGNESIUM SERPL-MCNC: 1.6 MG/DL (ref 1.6–2.4)
MCH RBC QN AUTO: 30.5 PG (ref 26–34)
MCHC RBC AUTO-ENTMCNC: 31.3 G/DL (ref 30–36.5)
MCV RBC AUTO: 97.3 FL (ref 80–99)
NRBC # BLD: 0 K/UL (ref 0–0.01)
NRBC BLD-RTO: 0 PER 100 WBC
PHOSPHATE SERPL-MCNC: 3.6 MG/DL (ref 2.6–4.7)
PLATELET # BLD AUTO: 131 K/UL (ref 150–400)
PMV BLD AUTO: 12.6 FL (ref 8.9–12.9)
POTASSIUM SERPL-SCNC: 3.8 MMOL/L (ref 3.5–5.1)
PROT SERPL-MCNC: 7 G/DL (ref 6.4–8.2)
RBC # BLD AUTO: 4.07 M/UL (ref 3.8–5.2)
RSV RNA SPEC QL NAA+PROBE: NOT DETECTED
RV+EV RNA SPEC QL NAA+PROBE: NOT DETECTED
SARS-COV-2 RNA RESP QL NAA+PROBE: NOT DETECTED
SERVICE CMNT-IMP: ABNORMAL
SERVICE CMNT-IMP: NORMAL
SODIUM SERPL-SCNC: 141 MMOL/L (ref 136–145)
WBC # BLD AUTO: 26 K/UL (ref 3.6–11)

## 2023-10-07 PROCEDURE — 97535 SELF CARE MNGMENT TRAINING: CPT | Performed by: OCCUPATIONAL THERAPIST

## 2023-10-07 PROCEDURE — 36415 COLL VENOUS BLD VENIPUNCTURE: CPT

## 2023-10-07 PROCEDURE — 83036 HEMOGLOBIN GLYCOSYLATED A1C: CPT

## 2023-10-07 PROCEDURE — 97161 PT EVAL LOW COMPLEX 20 MIN: CPT

## 2023-10-07 PROCEDURE — 97116 GAIT TRAINING THERAPY: CPT

## 2023-10-07 PROCEDURE — 80053 COMPREHEN METABOLIC PANEL: CPT

## 2023-10-07 PROCEDURE — 6360000002 HC RX W HCPCS: Performed by: INTERNAL MEDICINE

## 2023-10-07 PROCEDURE — 6370000000 HC RX 637 (ALT 250 FOR IP): Performed by: INTERNAL MEDICINE

## 2023-10-07 PROCEDURE — 2580000003 HC RX 258: Performed by: INTERNAL MEDICINE

## 2023-10-07 PROCEDURE — 97165 OT EVAL LOW COMPLEX 30 MIN: CPT | Performed by: OCCUPATIONAL THERAPIST

## 2023-10-07 PROCEDURE — 2580000003 HC RX 258: Performed by: STUDENT IN AN ORGANIZED HEALTH CARE EDUCATION/TRAINING PROGRAM

## 2023-10-07 PROCEDURE — 93010 ELECTROCARDIOGRAM REPORT: CPT | Performed by: SPECIALIST

## 2023-10-07 PROCEDURE — 83605 ASSAY OF LACTIC ACID: CPT

## 2023-10-07 PROCEDURE — 1100000000 HC RM PRIVATE

## 2023-10-07 PROCEDURE — 87040 BLOOD CULTURE FOR BACTERIA: CPT

## 2023-10-07 PROCEDURE — 92610 EVALUATE SWALLOWING FUNCTION: CPT

## 2023-10-07 PROCEDURE — 82962 GLUCOSE BLOOD TEST: CPT

## 2023-10-07 PROCEDURE — 85027 COMPLETE CBC AUTOMATED: CPT

## 2023-10-07 PROCEDURE — 0202U NFCT DS 22 TRGT SARS-COV-2: CPT

## 2023-10-07 PROCEDURE — 94761 N-INVAS EAR/PLS OXIMETRY MLT: CPT

## 2023-10-07 PROCEDURE — 83735 ASSAY OF MAGNESIUM: CPT

## 2023-10-07 PROCEDURE — 84100 ASSAY OF PHOSPHORUS: CPT

## 2023-10-07 RX ADMIN — PIPERACILLIN AND TAZOBACTAM 3375 MG: 3; .375 INJECTION, POWDER, LYOPHILIZED, FOR SOLUTION INTRAVENOUS at 13:30

## 2023-10-07 RX ADMIN — ACETAMINOPHEN 650 MG: 325 TABLET ORAL at 13:30

## 2023-10-07 RX ADMIN — PIPERACILLIN AND TAZOBACTAM 3375 MG: 3; .375 INJECTION, POWDER, LYOPHILIZED, FOR SOLUTION INTRAVENOUS at 06:43

## 2023-10-07 RX ADMIN — PIPERACILLIN AND TAZOBACTAM 3375 MG: 3; .375 INJECTION, POWDER, LYOPHILIZED, FOR SOLUTION INTRAVENOUS at 22:32

## 2023-10-07 RX ADMIN — MORPHINE SULFATE 2 MG: 2 INJECTION, SOLUTION INTRAMUSCULAR; INTRAVENOUS at 11:06

## 2023-10-07 RX ADMIN — ENOXAPARIN SODIUM 40 MG: 100 INJECTION SUBCUTANEOUS at 09:10

## 2023-10-07 RX ADMIN — SODIUM CHLORIDE, PRESERVATIVE FREE 10 ML: 5 INJECTION INTRAVENOUS at 09:10

## 2023-10-07 RX ADMIN — ACETAMINOPHEN 650 MG: 325 TABLET ORAL at 21:25

## 2023-10-07 RX ADMIN — SODIUM CHLORIDE: 9 INJECTION, SOLUTION INTRAVENOUS at 17:37

## 2023-10-07 RX ADMIN — SODIUM CHLORIDE, PRESERVATIVE FREE 10 ML: 5 INJECTION INTRAVENOUS at 21:29

## 2023-10-07 ASSESSMENT — PAIN SCALES - GENERAL
PAINLEVEL_OUTOF10: 0
PAINLEVEL_OUTOF10: 0
PAINLEVEL_OUTOF10: 8
PAINLEVEL_OUTOF10: 4
PAINLEVEL_OUTOF10: 0
PAINLEVEL_OUTOF10: 8

## 2023-10-07 ASSESSMENT — PAIN - FUNCTIONAL ASSESSMENT: PAIN_FUNCTIONAL_ASSESSMENT: PREVENTS OR INTERFERES SOME ACTIVE ACTIVITIES AND ADLS

## 2023-10-07 ASSESSMENT — PAIN DESCRIPTION - ORIENTATION
ORIENTATION: RIGHT
ORIENTATION: MID

## 2023-10-07 ASSESSMENT — PAIN DESCRIPTION - DESCRIPTORS
DESCRIPTORS: ACHING
DESCRIPTORS: ACHING

## 2023-10-07 ASSESSMENT — PAIN DESCRIPTION - LOCATION
LOCATION: ABDOMEN
LOCATION: ABDOMEN
LOCATION: BACK

## 2023-10-07 NOTE — ANESTHESIA POSTPROCEDURE EVALUATION
Department of Anesthesiology  Postprocedure Note    Patient: Orelia Sandhoff  MRN: [id number]  YOB: 1945  Date of evaluation: 10/6/2023      Procedure Summary     Date: 10/06/23 Room / Location: [facility] MAIN OR  / [facility] MAIN OR    Anesthesia Start: 1951 Anesthesia Stop: 2053    Procedure: CYSTOSCOPY RETROGRADES AND LEFT URETERAL STENT INSERTION (Left: Ureter) Diagnosis:       Kidney stone      (Kidney stone [N20.0])    Surgeons: Sera Bennett MD Responsible Provider: Marlin Urias MD    Anesthesia Type: general ASA Status: 3          Anesthesia Type: No value filed.     Jason Phase I: Jason Score: 9    Jason Phase II:        Anesthesia Post Evaluation    Patient location during evaluation: PACU  Patient participation: complete - patient participated  Level of consciousness: awake  Airway patency: patent  Nausea & Vomiting: no vomiting and no nausea  Complications: no  Cardiovascular status: hemodynamically stable  Respiratory status: acceptable  Hydration status: stable  Pain management: adequate

## 2023-10-07 NOTE — BRIEF OP NOTE
Brief Postoperative Note      Patient: Fidel Cedillo  YOB: 1945  MRN: [id number]    Date of Procedure: 10/6/2023    Pre-Op Diagnosis Codes:     * Kidney stone [N20.0]    Post-Op Diagnosis: Same       Procedure(s):  CYSTOSCOPY RETROGRADES AND LEFT URETERAL STENT INSERTION    Surgeon(s):  Odalis Cueva MD    Assistant:  * No surgical staff found *    Anesthesia: General    Estimated Blood Loss (mL): Minimal    Complications: None    Specimens:   * No specimens in log *    Implants:  Implant Name Type Inv.  Item Serial No.  Lot No. LRB No. Used Action   STENT URET 7FR L24CM PERCFLX HYDR+ SFT PGTL TAPR TIP W/O - SNA  STENT URET 7FR L24CM PERCFLX HYDR+ SFT PGTL TAPR TIP W/O NA [employer] [facility]- [id number] Left 1 Implanted         Drains: L ureteral stent    Findings: mild L hydro      Electronically signed by Margoth Ibarra MD on 10/6/2023 at 10:19 PM    [id number]

## 2023-10-07 NOTE — PERIOP NOTE
TRANSFER - IN REPORT:    Verbal report received from Staff RN on Sparkle Blackwell  being received from ED  for ordered procedure      Report consisted of patient's Situation, Background, Assessment and   Recommendations(SBAR). Information from the following report(s) Nurse Handoff Report was reviewed with the receiving nurse. Opportunity for questions and clarification was provided. Assessment completed upon patient's arrival to unit and care assumed.
TRANSFER - OUT REPORT:    Verbal report given to SALLIE Pickard on Gil Renteria  being transferred to 5th floor for routine post-op       Report consisted of patient's Situation, Background, Assessment and   Recommendations(SBAR). Information from the following report(s) Nurse Handoff Report, Adult Overview, Surgery Report, Intake/Output, and MAR was reviewed with the receiving nurse. Lines:   Peripheral IV 10/06/23 Right Antecubital (Active)   Site Assessment Ecchymotic 10/06/23 2100   Line Status Brisk blood return 10/06/23 2100   Line Care Connections checked and tightened 10/06/23 2100   Phlebitis Assessment Erythema at access site with or without pain 10/06/23 2100   Infiltration Assessment 0 10/06/23 2100   Dressing Status Old drainage noted 10/06/23 2100   Dressing Type Transparent 10/06/23 2100       Peripheral IV 10/06/23 Distal;Left Forearm (Active)   Site Assessment Clean, dry & intact 10/06/23 2100   Line Status Flushed 10/06/23 2100   Line Care Connections checked and tightened 10/06/23 2100   Phlebitis Assessment No symptoms 10/06/23 2100   Infiltration Assessment 0 10/06/23 2100   Alcohol Cap Used No 10/06/23 2100   Dressing Status Clean, dry & intact 10/06/23 2100   Dressing Type Transparent 10/06/23 2100       Peripheral IV 10/06/23 Left Forearm (Active)        Opportunity for questions and clarification was provided.       Patient transported with:  O2 @ 2lpm and Registered Nurse
Offered, feeding was successful

## 2023-10-08 LAB
ALBUMIN SERPL-MCNC: 2.4 G/DL (ref 3.5–5)
ALBUMIN/GLOB SERPL: 0.7 (ref 1.1–2.2)
ALP SERPL-CCNC: 55 U/L (ref 45–117)
ALT SERPL-CCNC: 16 U/L (ref 12–78)
ANION GAP SERPL CALC-SCNC: 5 MMOL/L (ref 5–15)
AST SERPL-CCNC: 19 U/L (ref 15–37)
BACTERIA SPEC CULT: ABNORMAL
BACTERIA SPEC CULT: NORMAL
BACTERIA SPEC CULT: NORMAL
BILIRUB SERPL-MCNC: 0.6 MG/DL (ref 0.2–1)
BUN SERPL-MCNC: 12 MG/DL (ref 6–20)
BUN/CREAT SERPL: 18 (ref 12–20)
CALCIUM SERPL-MCNC: 8.3 MG/DL (ref 8.5–10.1)
CHLORIDE SERPL-SCNC: 114 MMOL/L (ref 97–108)
CO2 SERPL-SCNC: 23 MMOL/L (ref 21–32)
CREAT SERPL-MCNC: 0.66 MG/DL (ref 0.55–1.02)
ERYTHROCYTE [DISTWIDTH] IN BLOOD BY AUTOMATED COUNT: 14.7 % (ref 11.5–14.5)
GLOBULIN SER CALC-MCNC: 3.4 G/DL (ref 2–4)
GLUCOSE BLD STRIP.AUTO-MCNC: 100 MG/DL (ref 65–117)
GLUCOSE BLD STRIP.AUTO-MCNC: 228 MG/DL (ref 65–117)
GLUCOSE BLD STRIP.AUTO-MCNC: 95 MG/DL (ref 65–117)
GLUCOSE BLD STRIP.AUTO-MCNC: 99 MG/DL (ref 65–117)
GLUCOSE SERPL-MCNC: 112 MG/DL (ref 65–100)
HCT VFR BLD AUTO: 34 % (ref 35–47)
HGB BLD-MCNC: 11 G/DL (ref 11.5–16)
MAGNESIUM SERPL-MCNC: 1.8 MG/DL (ref 1.6–2.4)
MCH RBC QN AUTO: 31 PG (ref 26–34)
MCHC RBC AUTO-ENTMCNC: 32.4 G/DL (ref 30–36.5)
MCV RBC AUTO: 95.8 FL (ref 80–99)
NRBC # BLD: 0 K/UL (ref 0–0.01)
NRBC BLD-RTO: 0 PER 100 WBC
PHOSPHATE SERPL-MCNC: 1.5 MG/DL (ref 2.6–4.7)
PLATELET # BLD AUTO: 108 K/UL (ref 150–400)
PMV BLD AUTO: 12.8 FL (ref 8.9–12.9)
POTASSIUM SERPL-SCNC: 3.6 MMOL/L (ref 3.5–5.1)
PROT SERPL-MCNC: 5.8 G/DL (ref 6.4–8.2)
RBC # BLD AUTO: 3.55 M/UL (ref 3.8–5.2)
SERVICE CMNT-IMP: ABNORMAL
SERVICE CMNT-IMP: NORMAL
SODIUM SERPL-SCNC: 142 MMOL/L (ref 136–145)
WBC # BLD AUTO: 18.5 K/UL (ref 3.6–11)

## 2023-10-08 PROCEDURE — 2500000003 HC RX 250 WO HCPCS: Performed by: INTERNAL MEDICINE

## 2023-10-08 PROCEDURE — 2580000003 HC RX 258: Performed by: INTERNAL MEDICINE

## 2023-10-08 PROCEDURE — 82962 GLUCOSE BLOOD TEST: CPT

## 2023-10-08 PROCEDURE — 6360000002 HC RX W HCPCS: Performed by: INTERNAL MEDICINE

## 2023-10-08 PROCEDURE — 1100000000 HC RM PRIVATE

## 2023-10-08 PROCEDURE — 80053 COMPREHEN METABOLIC PANEL: CPT

## 2023-10-08 PROCEDURE — 2580000003 HC RX 258: Performed by: STUDENT IN AN ORGANIZED HEALTH CARE EDUCATION/TRAINING PROGRAM

## 2023-10-08 PROCEDURE — 6370000000 HC RX 637 (ALT 250 FOR IP): Performed by: INTERNAL MEDICINE

## 2023-10-08 PROCEDURE — 36415 COLL VENOUS BLD VENIPUNCTURE: CPT

## 2023-10-08 PROCEDURE — 83735 ASSAY OF MAGNESIUM: CPT

## 2023-10-08 PROCEDURE — 85027 COMPLETE CBC AUTOMATED: CPT

## 2023-10-08 PROCEDURE — 84100 ASSAY OF PHOSPHORUS: CPT

## 2023-10-08 RX ADMIN — SODIUM CHLORIDE, PRESERVATIVE FREE 10 ML: 5 INJECTION INTRAVENOUS at 19:53

## 2023-10-08 RX ADMIN — SODIUM PHOSPHATE, MONOBASIC, MONOHYDRATE AND SODIUM PHOSPHATE, DIBASIC, ANHYDROUS 10 MMOL: 142; 276 INJECTION, SOLUTION INTRAVENOUS at 19:51

## 2023-10-08 RX ADMIN — PIPERACILLIN AND TAZOBACTAM 3375 MG: 3; .375 INJECTION, POWDER, LYOPHILIZED, FOR SOLUTION INTRAVENOUS at 14:22

## 2023-10-08 RX ADMIN — MORPHINE SULFATE 2 MG: 2 INJECTION, SOLUTION INTRAMUSCULAR; INTRAVENOUS at 14:23

## 2023-10-08 RX ADMIN — PIPERACILLIN AND TAZOBACTAM 3375 MG: 3; .375 INJECTION, POWDER, LYOPHILIZED, FOR SOLUTION INTRAVENOUS at 05:33

## 2023-10-08 RX ADMIN — INSULIN LISPRO 2 UNITS: 100 INJECTION, SOLUTION INTRAVENOUS; SUBCUTANEOUS at 12:31

## 2023-10-08 RX ADMIN — ENOXAPARIN SODIUM 40 MG: 100 INJECTION SUBCUTANEOUS at 09:38

## 2023-10-08 RX ADMIN — SODIUM CHLORIDE, PRESERVATIVE FREE 10 ML: 5 INJECTION INTRAVENOUS at 09:39

## 2023-10-08 RX ADMIN — PIPERACILLIN AND TAZOBACTAM 3375 MG: 3; .375 INJECTION, POWDER, LYOPHILIZED, FOR SOLUTION INTRAVENOUS at 21:58

## 2023-10-08 RX ADMIN — SODIUM CHLORIDE: 9 INJECTION, SOLUTION INTRAVENOUS at 03:00

## 2023-10-08 ASSESSMENT — PAIN DESCRIPTION - DESCRIPTORS: DESCRIPTORS: ACHING

## 2023-10-08 ASSESSMENT — PAIN SCALES - GENERAL: PAINLEVEL_OUTOF10: 9

## 2023-10-09 PROBLEM — M54.9 ACUTE BACK PAIN: Status: ACTIVE | Noted: 2023-10-09

## 2023-10-09 PROBLEM — Z51.5 PALLIATIVE CARE ENCOUNTER: Status: ACTIVE | Noted: 2023-10-09

## 2023-10-09 LAB
ANION GAP SERPL CALC-SCNC: 6 MMOL/L (ref 5–15)
BACTERIA SPEC CULT: ABNORMAL
BACTERIA SPEC CULT: ABNORMAL
BUN SERPL-MCNC: 9 MG/DL (ref 6–20)
BUN/CREAT SERPL: 19 (ref 12–20)
CALCIUM SERPL-MCNC: 8.4 MG/DL (ref 8.5–10.1)
CC UR VC: ABNORMAL
CHLORIDE SERPL-SCNC: 111 MMOL/L (ref 97–108)
CO2 SERPL-SCNC: 24 MMOL/L (ref 21–32)
CREAT SERPL-MCNC: 0.48 MG/DL (ref 0.55–1.02)
ERYTHROCYTE [DISTWIDTH] IN BLOOD BY AUTOMATED COUNT: 14.1 % (ref 11.5–14.5)
GLUCOSE BLD STRIP.AUTO-MCNC: 115 MG/DL (ref 65–117)
GLUCOSE BLD STRIP.AUTO-MCNC: 118 MG/DL (ref 65–117)
GLUCOSE BLD STRIP.AUTO-MCNC: 96 MG/DL (ref 65–117)
GLUCOSE BLD STRIP.AUTO-MCNC: 99 MG/DL (ref 65–117)
GLUCOSE SERPL-MCNC: 90 MG/DL (ref 65–100)
HCT VFR BLD AUTO: 35.2 % (ref 35–47)
HGB BLD-MCNC: 11.1 G/DL (ref 11.5–16)
MCH RBC QN AUTO: 29.4 PG (ref 26–34)
MCHC RBC AUTO-ENTMCNC: 31.5 G/DL (ref 30–36.5)
MCV RBC AUTO: 93.1 FL (ref 80–99)
NRBC # BLD: 0 K/UL (ref 0–0.01)
NRBC BLD-RTO: 0 PER 100 WBC
PLATELET # BLD AUTO: 121 K/UL (ref 150–400)
PMV BLD AUTO: 12.7 FL (ref 8.9–12.9)
POTASSIUM SERPL-SCNC: 3.3 MMOL/L (ref 3.5–5.1)
RBC # BLD AUTO: 3.78 M/UL (ref 3.8–5.2)
SERVICE CMNT-IMP: ABNORMAL
SERVICE CMNT-IMP: NORMAL
SODIUM SERPL-SCNC: 141 MMOL/L (ref 136–145)
WBC # BLD AUTO: 12.8 K/UL (ref 3.6–11)

## 2023-10-09 PROCEDURE — 2580000003 HC RX 258: Performed by: INTERNAL MEDICINE

## 2023-10-09 PROCEDURE — 87040 BLOOD CULTURE FOR BACTERIA: CPT

## 2023-10-09 PROCEDURE — 99233 SBSQ HOSP IP/OBS HIGH 50: CPT | Performed by: NURSE PRACTITIONER

## 2023-10-09 PROCEDURE — 97116 GAIT TRAINING THERAPY: CPT

## 2023-10-09 PROCEDURE — 85027 COMPLETE CBC AUTOMATED: CPT

## 2023-10-09 PROCEDURE — 82962 GLUCOSE BLOOD TEST: CPT

## 2023-10-09 PROCEDURE — 36415 COLL VENOUS BLD VENIPUNCTURE: CPT

## 2023-10-09 PROCEDURE — 1100000000 HC RM PRIVATE

## 2023-10-09 PROCEDURE — 6360000002 HC RX W HCPCS: Performed by: INTERNAL MEDICINE

## 2023-10-09 PROCEDURE — 6370000000 HC RX 637 (ALT 250 FOR IP): Performed by: INTERNAL MEDICINE

## 2023-10-09 PROCEDURE — 2700000000 HC OXYGEN THERAPY PER DAY

## 2023-10-09 PROCEDURE — 94761 N-INVAS EAR/PLS OXIMETRY MLT: CPT

## 2023-10-09 PROCEDURE — 97530 THERAPEUTIC ACTIVITIES: CPT

## 2023-10-09 PROCEDURE — 80048 BASIC METABOLIC PNL TOTAL CA: CPT

## 2023-10-09 RX ADMIN — SODIUM CHLORIDE, PRESERVATIVE FREE 10 ML: 5 INJECTION INTRAVENOUS at 21:45

## 2023-10-09 RX ADMIN — PIPERACILLIN AND TAZOBACTAM 3375 MG: 3; .375 INJECTION, POWDER, LYOPHILIZED, FOR SOLUTION INTRAVENOUS at 21:46

## 2023-10-09 RX ADMIN — SODIUM CHLORIDE, PRESERVATIVE FREE 10 ML: 5 INJECTION INTRAVENOUS at 09:35

## 2023-10-09 RX ADMIN — PIPERACILLIN AND TAZOBACTAM 3375 MG: 3; .375 INJECTION, POWDER, LYOPHILIZED, FOR SOLUTION INTRAVENOUS at 05:42

## 2023-10-09 RX ADMIN — POTASSIUM BICARBONATE 40 MEQ: 782 TABLET, EFFERVESCENT ORAL at 09:34

## 2023-10-09 RX ADMIN — ENOXAPARIN SODIUM 40 MG: 100 INJECTION SUBCUTANEOUS at 09:33

## 2023-10-09 RX ADMIN — PIPERACILLIN AND TAZOBACTAM 3375 MG: 3; .375 INJECTION, POWDER, LYOPHILIZED, FOR SOLUTION INTRAVENOUS at 14:10

## 2023-10-09 RX ADMIN — MORPHINE SULFATE 2 MG: 2 INJECTION, SOLUTION INTRAMUSCULAR; INTRAVENOUS at 10:14

## 2023-10-09 ASSESSMENT — PAIN SCALES - GENERAL
PAINLEVEL_OUTOF10: 8
PAINLEVEL_OUTOF10: 3

## 2023-10-09 ASSESSMENT — PAIN DESCRIPTION - DESCRIPTORS: DESCRIPTORS: ACHING

## 2023-10-09 ASSESSMENT — PAIN DESCRIPTION - ORIENTATION: ORIENTATION: POSTERIOR

## 2023-10-09 ASSESSMENT — PAIN DESCRIPTION - LOCATION: LOCATION: BACK

## 2023-10-09 NOTE — CARE COORDINATION
10/9/2023  3:47 PM  Pt emergently admitted 10/6/23 for Urosepsis / UTI (urinary tract infection) / Leukocytosis / Fever   Pt emergently admitted 10/6/23 discussed in IDR is continuing to require medical management   Transitions of Care Plan:  RUR 11 % Low Risk of Readmission/Green  LOS 3 Days   Medical management continues, Cx pending   Urology following s/p cystoscopy w/ L stent, POD # 3   PT following, recommending HH at [facility] , DME TBD pending progress  Pt will need orders sent to [facility] for [facility]   CM to follow through for treatment/response  Pt resides at [address] [place] [address],  spoke w/ Olga Gilmore  will to assess pt Wednesday 10/11 @ 11 AM, pt will need Rapid COVID to return to facility  [facility]   Outpatient follow up PCP, specialists  [facility] can provide transport at [address]  CM will continue to follow and assist  Blase How

## 2023-10-10 LAB
GLUCOSE BLD STRIP.AUTO-MCNC: 113 MG/DL (ref 65–117)
GLUCOSE BLD STRIP.AUTO-MCNC: 74 MG/DL (ref 65–117)
GLUCOSE BLD STRIP.AUTO-MCNC: 88 MG/DL (ref 65–117)
GLUCOSE BLD STRIP.AUTO-MCNC: 88 MG/DL (ref 65–117)
SERVICE CMNT-IMP: NORMAL

## 2023-10-10 PROCEDURE — 6360000002 HC RX W HCPCS: Performed by: INTERNAL MEDICINE

## 2023-10-10 PROCEDURE — 97530 THERAPEUTIC ACTIVITIES: CPT

## 2023-10-10 PROCEDURE — 97116 GAIT TRAINING THERAPY: CPT

## 2023-10-10 PROCEDURE — 82962 GLUCOSE BLOOD TEST: CPT

## 2023-10-10 PROCEDURE — 6360000002 HC RX W HCPCS: Performed by: EMERGENCY MEDICINE

## 2023-10-10 PROCEDURE — 1100000000 HC RM PRIVATE

## 2023-10-10 PROCEDURE — 94761 N-INVAS EAR/PLS OXIMETRY MLT: CPT

## 2023-10-10 PROCEDURE — 2580000003 HC RX 258: Performed by: INTERNAL MEDICINE

## 2023-10-10 PROCEDURE — 6370000000 HC RX 637 (ALT 250 FOR IP): Performed by: INTERNAL MEDICINE

## 2023-10-10 RX ORDER — CETIRIZINE HYDROCHLORIDE 5 MG/1
5 TABLET ORAL DAILY
COMMUNITY

## 2023-10-10 RX ADMIN — WATER 2000 MG: 1 INJECTION INTRAMUSCULAR; INTRAVENOUS; SUBCUTANEOUS at 11:56

## 2023-10-10 RX ADMIN — ONDANSETRON 4 MG: 2 INJECTION INTRAMUSCULAR; INTRAVENOUS at 12:10

## 2023-10-10 RX ADMIN — MORPHINE SULFATE 2 MG: 2 INJECTION, SOLUTION INTRAMUSCULAR; INTRAVENOUS at 13:05

## 2023-10-10 RX ADMIN — PIPERACILLIN AND TAZOBACTAM 3375 MG: 3; .375 INJECTION, POWDER, LYOPHILIZED, FOR SOLUTION INTRAVENOUS at 05:45

## 2023-10-10 RX ADMIN — SODIUM CHLORIDE, PRESERVATIVE FREE 10 ML: 5 INJECTION INTRAVENOUS at 22:21

## 2023-10-10 RX ADMIN — ACETAMINOPHEN 650 MG: 325 TABLET ORAL at 00:41

## 2023-10-10 RX ADMIN — ENOXAPARIN SODIUM 40 MG: 100 INJECTION SUBCUTANEOUS at 08:43

## 2023-10-10 ASSESSMENT — PAIN DESCRIPTION - ORIENTATION: ORIENTATION: MID

## 2023-10-10 ASSESSMENT — PAIN DESCRIPTION - LOCATION
LOCATION: BACK

## 2023-10-10 ASSESSMENT — PAIN SCALES - GENERAL
PAINLEVEL_OUTOF10: 0
PAINLEVEL_OUTOF10: 6
PAINLEVEL_OUTOF10: 9

## 2023-10-10 ASSESSMENT — PAIN DESCRIPTION - DESCRIPTORS: DESCRIPTORS: ACHING

## 2023-10-10 NOTE — CARE COORDINATION
10/10/2023  1:49 PM  :pt emergently admitted  10/6/23 for Urosepsis / UTI (urinary tract infection) / Leukocytosis / Fever   Pt emergently admitted 10/6/23 discussed in IDR is continuing to require medical management   Transitions of Care Plan:  RUR 11 % Low Risk of Readmission/Green  LOS  4  Days   Medical management continues, Cx pending   Urology following s/p cystoscopy w/ L stent, POD # 4   PT following, recommending HH at [facility] , DME TBD pending progress  Pt will need orders sent to [facility] for [facility] to follow through for treatment/response  Pt resides at [facility],  spoke w/ Sue Baez  will to assess pt Wednesday 10/11 @ 11 AM, pt will need Rapid COVID to return to facility  [facility]   Outpatient follow up PCP, specialists  [facility] can provide transport at [address]  CM will continue to follow and assist  Kia Diamond

## 2023-10-11 LAB
ANION GAP SERPL CALC-SCNC: 6 MMOL/L (ref 5–15)
BUN SERPL-MCNC: 9 MG/DL (ref 6–20)
BUN/CREAT SERPL: 17 (ref 12–20)
CALCIUM SERPL-MCNC: 8.7 MG/DL (ref 8.5–10.1)
CHLORIDE SERPL-SCNC: 109 MMOL/L (ref 97–108)
CO2 SERPL-SCNC: 27 MMOL/L (ref 21–32)
CREAT SERPL-MCNC: 0.52 MG/DL (ref 0.55–1.02)
ERYTHROCYTE [DISTWIDTH] IN BLOOD BY AUTOMATED COUNT: 13.8 % (ref 11.5–14.5)
GLUCOSE BLD STRIP.AUTO-MCNC: 113 MG/DL (ref 65–117)
GLUCOSE BLD STRIP.AUTO-MCNC: 74 MG/DL (ref 65–117)
GLUCOSE BLD STRIP.AUTO-MCNC: 96 MG/DL (ref 65–117)
GLUCOSE BLD STRIP.AUTO-MCNC: 97 MG/DL (ref 65–117)
GLUCOSE SERPL-MCNC: 79 MG/DL (ref 65–100)
HCT VFR BLD AUTO: 37 % (ref 35–47)
HGB BLD-MCNC: 11.9 G/DL (ref 11.5–16)
MCH RBC QN AUTO: 29.5 PG (ref 26–34)
MCHC RBC AUTO-ENTMCNC: 32.2 G/DL (ref 30–36.5)
MCV RBC AUTO: 91.8 FL (ref 80–99)
NRBC # BLD: 0 K/UL (ref 0–0.01)
NRBC BLD-RTO: 0 PER 100 WBC
PLATELET # BLD AUTO: 155 K/UL (ref 150–400)
PMV BLD AUTO: 12.4 FL (ref 8.9–12.9)
POTASSIUM SERPL-SCNC: 3.2 MMOL/L (ref 3.5–5.1)
RBC # BLD AUTO: 4.03 M/UL (ref 3.8–5.2)
SARS-COV-2 RDRP RESP QL NAA+PROBE: NOT DETECTED
SERVICE CMNT-IMP: NORMAL
SODIUM SERPL-SCNC: 142 MMOL/L (ref 136–145)
SOURCE: NORMAL
WBC # BLD AUTO: 8.1 K/UL (ref 3.6–11)

## 2023-10-11 PROCEDURE — 36415 COLL VENOUS BLD VENIPUNCTURE: CPT

## 2023-10-11 PROCEDURE — 82962 GLUCOSE BLOOD TEST: CPT

## 2023-10-11 PROCEDURE — 6360000002 HC RX W HCPCS: Performed by: INTERNAL MEDICINE

## 2023-10-11 PROCEDURE — 97530 THERAPEUTIC ACTIVITIES: CPT

## 2023-10-11 PROCEDURE — 2580000003 HC RX 258: Performed by: INTERNAL MEDICINE

## 2023-10-11 PROCEDURE — 80048 BASIC METABOLIC PNL TOTAL CA: CPT

## 2023-10-11 PROCEDURE — 94761 N-INVAS EAR/PLS OXIMETRY MLT: CPT

## 2023-10-11 PROCEDURE — 1100000000 HC RM PRIVATE

## 2023-10-11 PROCEDURE — 85027 COMPLETE CBC AUTOMATED: CPT

## 2023-10-11 PROCEDURE — 97116 GAIT TRAINING THERAPY: CPT

## 2023-10-11 PROCEDURE — 87635 SARS-COV-2 COVID-19 AMP PRB: CPT

## 2023-10-11 PROCEDURE — 6370000000 HC RX 637 (ALT 250 FOR IP): Performed by: INTERNAL MEDICINE

## 2023-10-11 PROCEDURE — 99233 SBSQ HOSP IP/OBS HIGH 50: CPT | Performed by: NURSE PRACTITIONER

## 2023-10-11 RX ORDER — OXYCODONE HCL 5 MG/5 ML
5 SOLUTION, ORAL ORAL EVERY 4 HOURS PRN
Status: DISCONTINUED | OUTPATIENT
Start: 2023-10-11 | End: 2023-10-12 | Stop reason: HOSPADM

## 2023-10-11 RX ADMIN — POTASSIUM BICARBONATE 40 MEQ: 782 TABLET, EFFERVESCENT ORAL at 09:54

## 2023-10-11 RX ADMIN — ENOXAPARIN SODIUM 40 MG: 100 INJECTION SUBCUTANEOUS at 09:54

## 2023-10-11 RX ADMIN — WATER 2000 MG: 1 INJECTION INTRAMUSCULAR; INTRAVENOUS; SUBCUTANEOUS at 12:36

## 2023-10-11 RX ADMIN — SODIUM CHLORIDE, PRESERVATIVE FREE 10 ML: 5 INJECTION INTRAVENOUS at 09:55

## 2023-10-11 RX ADMIN — SODIUM CHLORIDE, PRESERVATIVE FREE 10 ML: 5 INJECTION INTRAVENOUS at 21:50

## 2023-10-11 ASSESSMENT — PAIN SCALES - GENERAL
PAINLEVEL_OUTOF10: 0

## 2023-10-11 NOTE — CARE COORDINATION
3:08 pm:  ISABELLA at [facility], Rylee Pena ([phone]), is requesting for patient to return to their facility tomorrow. [facility] will transport. Nursing please call report to [phone] on day of discharge.   AVS to be faxed to [phone].      12:09 pm:  CM faxed patient's COVID test results and HH order to aPtt 9%:  Medical management continues  Urology following s/p cystoscopy w/ L stent, POD # 5   PT following - recommending HH at [facility] to arrange [address] [facility] with [place]; [address] [facility] [address] orders faxed to DON    CM following- patient has been assessed by ISABELLA for return to [facility]; plan for return on 10/12  Outpatient follow up PCP, specialists  [facility] will provide transport at discharge

## 2023-10-11 NOTE — ACP (ADVANCE CARE PLANNING)
Primary Decision MakerCurtiss Andra - [phone]    Secondary Decision Maker: Heber Shafer - [phone]    Supplemental (Other) Decision Maker: Arash Engel - [phone]    Family provided copy of pt's AMD dated 9/13/2021 which appoints  Liam Anglin, dtr Naz Sanches, and dtr Fredi Westbrook as Medical POAs in respective order. Copy has been scanned into medical record.

## 2023-10-12 VITALS
TEMPERATURE: 98.4 F | SYSTOLIC BLOOD PRESSURE: 154 MMHG | OXYGEN SATURATION: 97 % | WEIGHT: 130 LBS | DIASTOLIC BLOOD PRESSURE: 66 MMHG | HEIGHT: 66 IN | HEART RATE: 78 BPM | RESPIRATION RATE: 18 BRPM | BODY MASS INDEX: 20.89 KG/M2

## 2023-10-12 LAB
ANION GAP SERPL CALC-SCNC: 6 MMOL/L (ref 5–15)
BACTERIA SPEC CULT: ABNORMAL
BUN SERPL-MCNC: 8 MG/DL (ref 6–20)
BUN/CREAT SERPL: 15 (ref 12–20)
CALCIUM SERPL-MCNC: 8.9 MG/DL (ref 8.5–10.1)
CHLORIDE SERPL-SCNC: 106 MMOL/L (ref 97–108)
CO2 SERPL-SCNC: 29 MMOL/L (ref 21–32)
CREAT SERPL-MCNC: 0.52 MG/DL (ref 0.55–1.02)
ERYTHROCYTE [DISTWIDTH] IN BLOOD BY AUTOMATED COUNT: 13.7 % (ref 11.5–14.5)
GLUCOSE BLD STRIP.AUTO-MCNC: 100 MG/DL (ref 65–117)
GLUCOSE BLD STRIP.AUTO-MCNC: 143 MG/DL (ref 65–117)
GLUCOSE SERPL-MCNC: 100 MG/DL (ref 65–100)
HCT VFR BLD AUTO: 36.7 % (ref 35–47)
HGB BLD-MCNC: 11.9 G/DL (ref 11.5–16)
MCH RBC QN AUTO: 29.8 PG (ref 26–34)
MCHC RBC AUTO-ENTMCNC: 32.4 G/DL (ref 30–36.5)
MCV RBC AUTO: 92 FL (ref 80–99)
NRBC # BLD: 0 K/UL (ref 0–0.01)
NRBC BLD-RTO: 0 PER 100 WBC
PLATELET # BLD AUTO: 176 K/UL (ref 150–400)
PMV BLD AUTO: 12.2 FL (ref 8.9–12.9)
POTASSIUM SERPL-SCNC: 3.3 MMOL/L (ref 3.5–5.1)
RBC # BLD AUTO: 3.99 M/UL (ref 3.8–5.2)
SERVICE CMNT-IMP: ABNORMAL
SERVICE CMNT-IMP: ABNORMAL
SERVICE CMNT-IMP: NORMAL
SODIUM SERPL-SCNC: 141 MMOL/L (ref 136–145)
WBC # BLD AUTO: 7.5 K/UL (ref 3.6–11)

## 2023-10-12 PROCEDURE — 80048 BASIC METABOLIC PNL TOTAL CA: CPT

## 2023-10-12 PROCEDURE — 2580000003 HC RX 258: Performed by: INTERNAL MEDICINE

## 2023-10-12 PROCEDURE — 36415 COLL VENOUS BLD VENIPUNCTURE: CPT

## 2023-10-12 PROCEDURE — 85027 COMPLETE CBC AUTOMATED: CPT

## 2023-10-12 PROCEDURE — 94761 N-INVAS EAR/PLS OXIMETRY MLT: CPT

## 2023-10-12 PROCEDURE — 6370000000 HC RX 637 (ALT 250 FOR IP): Performed by: INTERNAL MEDICINE

## 2023-10-12 PROCEDURE — 6360000002 HC RX W HCPCS: Performed by: INTERNAL MEDICINE

## 2023-10-12 PROCEDURE — 82962 GLUCOSE BLOOD TEST: CPT

## 2023-10-12 RX ORDER — CEFUROXIME AXETIL 250 MG/1
250 TABLET ORAL 2 TIMES DAILY
Qty: 20 TABLET | Refills: 0 | Status: SHIPPED | OUTPATIENT
Start: 2023-10-12 | End: 2023-10-22

## 2023-10-12 RX ADMIN — POTASSIUM BICARBONATE 40 MEQ: 782 TABLET, EFFERVESCENT ORAL at 09:48

## 2023-10-12 RX ADMIN — SODIUM CHLORIDE, PRESERVATIVE FREE 10 ML: 5 INJECTION INTRAVENOUS at 09:48

## 2023-10-12 RX ADMIN — ENOXAPARIN SODIUM 40 MG: 100 INJECTION SUBCUTANEOUS at 09:48

## 2023-10-12 ASSESSMENT — PAIN SCALES - GENERAL
PAINLEVEL_OUTOF10: 0
PAINLEVEL_OUTOF10: 0

## 2023-10-12 NOTE — CARE COORDINATION
10/12/23  11:22 AM    YAMEL called [facility] and they will send the  back over to  the patient.     Nursing please call report to [phone]     CM faxed over the AVS.    [facility]

## 2023-10-12 NOTE — DISCHARGE SUMMARY
Hospitalist Discharge Summary     Patient ID:  Brionna Ahn  [id number]  65 y.o.  1945    Admit date: 10/6/2023    Discharge date and time: 10/12/2023    Admission Diagnoses: Kidney stone [N20.0]  Second degree AV block, Mobitz type I [I44.1]  UTI (urinary tract infection) [N39.0]  Acute febrile illness [R50.9]  Severe sepsis ([address]) [A41.9, R65.20]  Community acquired pneumonia of right upper lobe of lung [J18.9]    Discharge Diagnoses:    Principal Problem:    UTI (urinary tract infection)  Active Problems:    Fever    Dehydration    Elevated lactic acid level    Pyuria    Nephrolithiasis    Hydronephrosis    Aspiration pneumonia (HCC)    Nausea & vomiting    Chronic back pain    Diabetes mellitus ([address])    Glaucoma    Palliative care encounter    Acute back pain  Resolved Problems:    * No resolved hospital problems. *     Hospital Course:   65 y.o. female who presented to the Emergency Department complaining of back pain. She is a poor historian due to dementia. Noted to be worse this AM.  Hx of severe chronic back pain. Associated today with nausea and vomiting, and a low grade fever. ER finds RUL opacity on CT chest, also hydronephrosis on CT abx, and UTI based on UA. She is dehydrated, but not septic. We will admit her for management. - Since admission pt noted to have proteus bactermia from UTI. Initial repeat blood cx were persistently pos, but she then cleared on cultures from the 9th. As such, we will continue cephalosporins for another 10 days as it was pansensitive on cultures. Pt improved well. Incidental findings on CT, recommend outpt vascular visit: Short segment occlusion right common iliac artery from the aortic bifurcation to the external/internal iliac  bifurcation. Patent distally. Imaging  CTA CHEST ABDOMEN PELVIS W CONTRAST    Result Date: 10/6/2023  1. No evidence of acute aortic dissection. 2. Atherosclerosis of the aorta and its branches.  Short segment occlusion right

## 2023-10-12 NOTE — DISCHARGE INSTRUCTIONS
HOSPITALIST DISCHARGE INSTRUCTIONS  NAME:  Samy Taylor   :     MRN:  [id number]     Date/Time:  10/12/2023 10:45 AM    ADMIT DATE: 10/6/2023     DISCHARGE DATE: 10/12/2023     DISCHARGE DIAGNOSIS:  Complicated UTI  Bacteremia from Proteus    DISCHARGE INSTRUCTIONS:  Thank you for allowing us to participate in your care. Your discharging Hospitalist is Shelley Lamb DO. You were admitted for evaluation and treatment of the above. - Take antibiotics for 10 more days     - See vascular to discuss whether intervention warranted for short segment occlusion R common iliac A. MEDICATIONS:    It is important that you take the medication exactly as they are prescribed. Keep your medication in the bottles provided by the pharmacist and keep a list of the medication names, dosages, and times to be taken in your wallet. Do not take other medications without consulting your doctor. If you experience any of the following symptoms then please call your primary care physician or return to the emergency room if you cannot get hold of your doctor:  Fever, chills, nausea, vomiting, diarrhea, change in mentation, falling, bleeding, shortness of breath    Follow Up:  Please call the below provider to arrange hospital follow up appointment      [facility]rology [address] [place] [address]/[facility]  [phone]  Go on 10/16/2023  Appoinment with Dr. Fransisco Mills at 2:50 pm    Primary Care    Follow up  1-2 weeks transition of care      For questions regarding your Hospitalization or to contact the [address] [place] [address] team, please call [phone]. Information obtained by :  I understand that if any problems occur once I am at home I am to contact my physician. I understand and acknowledge receipt of the instructions indicated above.

## 2023-10-15 LAB
BACTERIA SPEC CULT: NORMAL
BACTERIA SPEC CULT: NORMAL
SERVICE CMNT-IMP: NORMAL
SERVICE CMNT-IMP: NORMAL

## 2023-11-05 PROBLEM — E86.0 DEHYDRATION: Status: RESOLVED | Noted: 2023-10-06 | Resolved: 2023-11-05

## 2023-11-05 PROBLEM — N39.0 UTI (URINARY TRACT INFECTION): Status: RESOLVED | Noted: 2023-10-06 | Resolved: 2023-11-05

## 2024-11-26 ENCOUNTER — APPOINTMENT (OUTPATIENT)
Facility: HOSPITAL | Age: 79
End: 2024-11-26
Payer: MEDICARE

## 2024-11-26 ENCOUNTER — HOSPITAL ENCOUNTER (EMERGENCY)
Facility: HOSPITAL | Age: 79
Discharge: HOME | End: 2024-11-26
Attending: EMERGENCY MEDICINE
Payer: MEDICARE

## 2024-11-26 VITALS
RESPIRATION RATE: 12 BRPM | WEIGHT: 110 LBS | TEMPERATURE: 98.6 F | DIASTOLIC BLOOD PRESSURE: 56 MMHG | BODY MASS INDEX: 17.68 KG/M2 | OXYGEN SATURATION: 99 % | HEART RATE: 82 BPM | HEIGHT: 66 IN | SYSTOLIC BLOOD PRESSURE: 93 MMHG

## 2024-11-26 DIAGNOSIS — R31.9 URINARY TRACT INFECTION WITH HEMATURIA, SITE UNSPECIFIED: ICD-10-CM

## 2024-11-26 DIAGNOSIS — S72.91XA CLOSED FRACTURE OF RIGHT FEMUR, UNSPECIFIED FRACTURE MORPHOLOGY, UNSPECIFIED PORTION OF FEMUR, INITIAL ENCOUNTER (HCC): ICD-10-CM

## 2024-11-26 DIAGNOSIS — N39.0 URINARY TRACT INFECTION WITH HEMATURIA, SITE UNSPECIFIED: ICD-10-CM

## 2024-11-26 DIAGNOSIS — S01.81XA FACIAL LACERATION, INITIAL ENCOUNTER: ICD-10-CM
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
## 2024-11-26 LAB
ALBUMIN SERPL-MCNC: 2.9 G/DL (ref 3.5–5)
ALBUMIN/GLOB SERPL: 0.6 (ref 1.1–2.2)
ALP SERPL-CCNC: 78 U/L (ref 45–117)
ALT SERPL-CCNC: 15 U/L (ref 12–78)
ANION GAP SERPL CALC-SCNC: 6 MMOL/L (ref 2–12)
APPEARANCE UR: ABNORMAL
AST SERPL-CCNC: 17 U/L (ref 15–37)
BACTERIA URNS QL MICRO: ABNORMAL /HPF
BASOPHILS # BLD: 0.1 K/UL (ref 0–0.1)
BASOPHILS NFR BLD: 0 % (ref 0–1)
BILIRUB SERPL-MCNC: 0.7 MG/DL (ref 0.2–1)
BILIRUB UR QL CFM: NEGATIVE
BUN SERPL-MCNC: 34 MG/DL (ref 6–20)
BUN/CREAT SERPL: 27 (ref 12–20)
CALCIUM SERPL-MCNC: 10.6 MG/DL (ref 8.5–10.1)
CHLORIDE SERPL-SCNC: 105 MMOL/L (ref 97–108)
CK SERPL-CCNC: 55 U/L (ref 26–192)
CO2 SERPL-SCNC: 27 MMOL/L (ref 21–32)
COLOR UR: ABNORMAL
COMMENT:: NORMAL
CREAT SERPL-MCNC: 1.24 MG/DL (ref 0.55–1.02)
DIFFERENTIAL METHOD BLD: ABNORMAL
EOSINOPHIL # BLD: 0 K/UL (ref 0–0.4)
EOSINOPHIL NFR BLD: 0 % (ref 0–7)
EPITH CASTS URNS QL MICRO: ABNORMAL /LPF
ERYTHROCYTE [DISTWIDTH] IN BLOOD BY AUTOMATED COUNT: 13.3 % (ref 11.5–14.5)
GLOBULIN SER CALC-MCNC: 4.6 G/DL (ref 2–4)
GLUCOSE SERPL-MCNC: 141 MG/DL (ref 65–100)
GLUCOSE UR STRIP.AUTO-MCNC: NEGATIVE MG/DL
GRAN CASTS URNS QL MICRO: ABNORMAL /LPF
HCT VFR BLD AUTO: 38.4 % (ref 35–47)
HGB BLD-MCNC: 12 G/DL (ref 11.5–16)
HGB UR QL STRIP: ABNORMAL
IMM GRANULOCYTES # BLD AUTO: 0.1 K/UL (ref 0–0.04)
IMM GRANULOCYTES NFR BLD AUTO: 1 % (ref 0–0.5)
KETONES UR QL STRIP.AUTO: NEGATIVE MG/DL
LACTATE SERPL-SCNC: 1.1 MMOL/L (ref 0.4–2)
LEUKOCYTE ESTERASE UR QL STRIP.AUTO: ABNORMAL
LYMPHOCYTES # BLD: 1 K/UL (ref 0.8–3.5)
LYMPHOCYTES NFR BLD: 8 % (ref 12–49)
MCH RBC QN AUTO: 29.7 PG (ref 26–34)
MCHC RBC AUTO-ENTMCNC: 31.3 G/DL (ref 30–36.5)
MCV RBC AUTO: 95 FL (ref 80–99)
MONOCYTES # BLD: 1 K/UL (ref 0–1)
MONOCYTES NFR BLD: 8 % (ref 5–13)
NEUTS SEG # BLD: 10.1 K/UL (ref 1.8–8)
NEUTS SEG NFR BLD: 83 % (ref 32–75)
NITRITE UR QL STRIP.AUTO: POSITIVE
NRBC # BLD: 0 K/UL (ref 0–0.01)
NRBC BLD-RTO: 0 PER 100 WBC
PH UR STRIP: 8.5 (ref 5–8)
PLATELET # BLD AUTO: 277 K/UL (ref 150–400)
PMV BLD AUTO: 11.7 FL (ref 8.9–12.9)
POTASSIUM SERPL-SCNC: 3.8 MMOL/L (ref 3.5–5.1)
PROT SERPL-MCNC: 7.5 G/DL (ref 6.4–8.2)
PROT UR STRIP-MCNC: 300 MG/DL
RBC # BLD AUTO: 4.04 M/UL (ref 3.8–5.2)
RBC #/AREA URNS HPF: >100 /HPF (ref 0–5)
SODIUM SERPL-SCNC: 138 MMOL/L (ref 136–145)
SP GR UR REFRACTOMETRY: 1.02 (ref 1–1.03)
SPECIMEN HOLD: NORMAL
TRI-PHOS CRY URNS QL MICRO: ABNORMAL
UROBILINOGEN UR QL STRIP.AUTO: 1 EU/DL (ref 0.2–1)
WBC # BLD AUTO: 12.2 K/UL (ref 3.6–11)
WBC URNS QL MICRO: >100 /HPF (ref 0–4)

## 2024-11-26 PROCEDURE — 96374 THER/PROPH/DIAG INJ IV PUSH: CPT

## 2024-11-26 PROCEDURE — 6370000000 HC RX 637 (ALT 250 FOR IP): Performed by: EMERGENCY MEDICINE

## 2024-11-26 PROCEDURE — 71045 X-RAY EXAM CHEST 1 VIEW: CPT

## 2024-11-26 PROCEDURE — 96372 THER/PROPH/DIAG INJ SC/IM: CPT

## 2024-11-26 PROCEDURE — 85025 COMPLETE CBC W/AUTO DIFF WBC: CPT

## 2024-11-26 PROCEDURE — 2580000003 HC RX 258: Performed by: STUDENT IN AN ORGANIZED HEALTH CARE EDUCATION/TRAINING PROGRAM

## 2024-11-26 PROCEDURE — 72128 CT CHEST SPINE W/O DYE: CPT

## 2024-11-26 PROCEDURE — 82550 ASSAY OF CK (CPK): CPT

## 2024-11-26 PROCEDURE — 73502 X-RAY EXAM HIP UNI 2-3 VIEWS: CPT

## 2024-11-26 PROCEDURE — 2500000003 HC RX 250 WO HCPCS: Performed by: STUDENT IN AN ORGANIZED HEALTH CARE EDUCATION/TRAINING PROGRAM

## 2024-11-26 PROCEDURE — 83605 ASSAY OF LACTIC ACID: CPT

## 2024-11-26 PROCEDURE — 94761 N-INVAS EAR/PLS OXIMETRY MLT: CPT

## 2024-11-26 PROCEDURE — 2700000000 HC OXYGEN THERAPY PER DAY

## 2024-11-26 PROCEDURE — 36415 COLL VENOUS BLD VENIPUNCTURE: CPT

## 2024-11-26 PROCEDURE — 72192 CT PELVIS W/O DYE: CPT

## 2024-11-26 PROCEDURE — 99285 EMERGENCY DEPT VISIT HI MDM: CPT

## 2024-11-26 PROCEDURE — 72131 CT LUMBAR SPINE W/O DYE: CPT

## 2024-11-26 PROCEDURE — 72125 CT NECK SPINE W/O DYE: CPT

## 2024-11-26 PROCEDURE — 87040 BLOOD CULTURE FOR BACTERIA: CPT

## 2024-11-26 PROCEDURE — 70450 CT HEAD/BRAIN W/O DYE: CPT

## 2024-11-26 PROCEDURE — 81001 URINALYSIS AUTO W/SCOPE: CPT

## 2024-11-26 PROCEDURE — 93005 ELECTROCARDIOGRAM TRACING: CPT | Performed by: EMERGENCY MEDICINE

## 2024-11-26 PROCEDURE — 80053 COMPREHEN METABOLIC PANEL: CPT

## 2024-11-26 PROCEDURE — 2500000003 HC RX 250 WO HCPCS: Performed by: EMERGENCY MEDICINE

## 2024-11-26 RX ORDER — HYDROMORPHONE HYDROCHLORIDE 1 MG/ML
1 INJECTION, SOLUTION INTRAMUSCULAR; INTRAVENOUS; SUBCUTANEOUS
Status: COMPLETED | OUTPATIENT
Start: 2024-11-26 | End: 2024-11-26

## 2024-11-26 RX ORDER — HYDROMORPHONE HYDROCHLORIDE 1 MG/ML
1 INJECTION, SOLUTION INTRAMUSCULAR; INTRAVENOUS; SUBCUTANEOUS ONCE
Status: COMPLETED | OUTPATIENT
Start: 2024-11-26 | End: 2024-11-26

## 2024-11-26 RX ORDER — 0.9 % SODIUM CHLORIDE 0.9 %
500 INTRAVENOUS SOLUTION INTRAVENOUS ONCE
Status: COMPLETED | OUTPATIENT
Start: 2024-11-26 | End: 2024-11-26

## 2024-11-26 RX ADMIN — Medication 3 ML: at 07:14

## 2024-11-26 RX ADMIN — HYDROMORPHONE HYDROCHLORIDE 1 MG: 1 INJECTION, SOLUTION INTRAMUSCULAR; INTRAVENOUS; SUBCUTANEOUS at 09:18

## 2024-11-26 RX ADMIN — HYDROMORPHONE HYDROCHLORIDE 1 MG: 1 INJECTION, SOLUTION INTRAMUSCULAR; INTRAVENOUS; SUBCUTANEOUS at 06:51

## 2024-11-26 RX ADMIN — SODIUM CHLORIDE 500 ML: 9 INJECTION, SOLUTION INTRAVENOUS at 11:16

## 2024-11-26 ASSESSMENT — PAIN DESCRIPTION - LOCATION
LOCATION: HIP
LOCATION: LEG

## 2024-11-26 ASSESSMENT — PAIN DESCRIPTION - DESCRIPTORS: DESCRIPTORS: ACHING;SHARP

## 2024-11-26 ASSESSMENT — PAIN - FUNCTIONAL ASSESSMENT: PAIN_FUNCTIONAL_ASSESSMENT: 0-10

## 2024-11-26 ASSESSMENT — PAIN DESCRIPTION - ORIENTATION
ORIENTATION: RIGHT
ORIENTATION: RIGHT;UPPER

## 2024-11-26 ASSESSMENT — PAIN DESCRIPTION - PAIN TYPE: TYPE: ACUTE PAIN

## 2024-11-26 ASSESSMENT — PAIN SCALES - GENERAL
PAINLEVEL_OUTOF10: 10
PAINLEVEL_OUTOF10: 8

## 2024-11-26 NOTE — ED PROVIDER NOTES
[facility] EMERGENCY DEPT  EMERGENCY DEPARTMENT ENCOUNTER      Patient Name: Natacha Velasquez  MRN: [id number]  Birthdate 1945  Date of Evaluation: 11/26/2024  Physician: Al Babcock MD    CHIEF COMPLAINT     No chief complaint on file.      HISTORY OF PRESENT ILLNESS   (Location/Symptom, Timing/Onset, Context/Setting, Quality, Duration, Modifying Factors, Severity)   Natacha Velasquez, 79 y.o., female     79-year-old female presents with right leg pain and back pain after a fall.  According to EMS, the patient is at an assisted living facility.  She reportedly fell around 4:30 AM.  When they arrived the patient was back in bed complaining of right leg pain and back pain.          Nursing Notes were reviewed.    REVIEW OF SYSTEMS    (Not required)   Review of Systems    Except as noted above the remainder of the review of systems was reviewed and negative.     PAST MEDICAL HISTORY     Past Medical History:   Diagnosis Date    Chronic back pain     Diabetes mellitus (HCC)     Glaucoma        SURGICAL HISTORY       Past Surgical History:   Procedure Laterality Date    CYSTOSCOPY Left 10/6/2023    CYSTOSCOPY RETROGRADES AND LEFT URETERAL STENT INSERTION performed by Librado Whipple MD at [facility] MAIN OR       CURRENT MEDICATIONS       Previous Medications    CETIRIZINE (ZYRTEC) 5 MG TABLET    Take 1 tablet by mouth daily       ALLERGIES     Patient has no known allergies.    FAMILY HISTORY     No family history on file.     SOCIAL HISTORY       Social History     Socioeconomic History    Marital status: Unknown   Tobacco Use    Smoking status: Never    Smokeless tobacco: Never   Substance and Sexual Activity    Alcohol use: Yes     Comment: if she goes out to eat    Drug use: Never       PHYSICAL EXAM     Vitals:    Vitals:    11/26/24 0647   BP: 120/75   Pulse: 92   Resp: 20   Temp: 98.8 °F (37.1 °C)   TempSrc: Oral   SpO2: 98%   Weight: 49.9 kg (110 lb)   Height: 1.676 m (5' 6\")       Physical Exam  Vitals and nursing

## 2024-11-26 NOTE — ED NOTES
Bedside and Verbal shift change report given to Sheri (oncoming nurse) by Lily (offgoing nurse). Report included the following information Nurse Handoff Report, ED Encounter Summary, ED SBAR, Intake/Output, MAR, Recent Results, and Neuro Assessment.     
Patient placed on 2L O2 via nc and switched pulse ox to left hand as patient had right hand up and behind her head.  
Patient taken to CT via stretcher at this time.  
Pt's daughter voiced her concern for her mother to not have surgery if that were an option. Pt normally resides at [facility], where they stated that they can control the pt's pain there.  
Spoke with Caridad at [facility] facility and they agree with hospice that patient can be d/c back to facility to manage pain and antibiotics.  
Straight cath performed by SALLIE Reyes with this nurse standby and pct. Patient had BM, cleaned, new brief on, and patient repositioned for comfort.   
Nitrite, Urine Positive (A) NEG      Leukocyte Esterase, Urine LARGE (A) NEG      WBC, UA >100 (H) 0 - 4 /hpf    RBC, UA >100 (H) 0 - 5 /hpf    Epithelial Cells, UA MODERATE (A) FEW /lpf    BACTERIA, URINE 4+ (A) NEG /hpf    TRIPLE PHOSPHATE CRYSTALS FEW (A) NEG      Granular Casts, UA 0-2 (A) NEG /lpf   Bilirubin, Confirmatory    Collection Time: 11/26/24  9:56 AM   Result Value Ref Range    Bilirubin Confirmation, UA Negative         IMAGING RESULTS:   CT HEAD WO CONTRAST   Final Result   No acute intracranial hemorrhage, mass or infarct.          Electronically signed by Juan Parker MD      CT CERVICAL SPINE WO CONTRAST   Final Result   No acute fracture or subluxation.         Electronically signed by Juan Parker MD      CT THORACIC SPINE WO CONTRAST   Final Result         1. Status post thoracolumbar fusion with T11 and T12 fractures with kyphoplasty   changes. No new fractures are identified. Incidental findings as above      Electronically signed by SaludFÃCIL      CT LUMBAR SPINE WO CONTRAST   Final Result         1. Status post thoracolumbar fusion with T11 and T12 fractures with kyphoplasty   changes. No new fractures are identified. Incidental findings as above      Electronically signed by SaludFÃCIL      CT PELVIS WO CONTRAST Additional Contrast? None   Final Result   1. Comminuted intertrochanteric right hip fracture         Electronically signed by Dale Marinelli      XR CHEST PORTABLE   Final Result      No acute process.         Electronically signed by Bakari Warner      XR HIP 2-3 VW W PELVIS RIGHT   Final Result   Acute right femoral neck fracture.      Electronically signed by Bakari Warner          MEDICATIONS GIVEN:   Medications   sodium chloride 0.9 % bolus 500 mL (has no administration in time range)   HYDROmorphone HCl PF (DILAUDID) injection 1 mg (1 mg IntraMUSCular Given 11/26/24 0651)   lidocaine-EPINEPHrine-tetracaine (LET) topical gel 3 mL syringe (3 mLs Topical Given

## 2024-11-27 LAB
EKG ATRIAL RATE: 95 BPM
EKG DIAGNOSIS: NORMAL
EKG P AXIS: 68 DEGREES
EKG P-R INTERVAL: 232 MS
EKG Q-T INTERVAL: 362 MS
EKG QRS DURATION: 76 MS
EKG QTC CALCULATION (BAZETT): 454 MS
EKG R AXIS: -7 DEGREES
EKG T AXIS: 75 DEGREES
EKG VENTRICULAR RATE: 95 BPM

## 2024-11-27 PROCEDURE — 93010 ELECTROCARDIOGRAM REPORT: CPT | Performed by: INTERNAL MEDICINE

## 2024-12-02 LAB
BACTERIA SPEC CULT: NORMAL
BACTERIA SPEC CULT: NORMAL
SERVICE CMNT-IMP: NORMAL
SERVICE CMNT-IMP: NORMAL

## (undated) DEVICE — GLOVE ORANGE PI 7   MSG9070

## (undated) DEVICE — SOLUTION IRRIG 1000ML STRL H2O USP PLAS POUR BTL

## (undated) DEVICE — 4-PORT MANIFOLD: Brand: NEPTUNE 2

## (undated) DEVICE — CYSTO-SFMC: Brand: MEDLINE INDUSTRIES, INC.

## (undated) DEVICE — OPEN-END URETERAL CATHETER: Brand: COOK

## (undated) DEVICE — SOLUTION IRRIGATION H2O 0797305] ICU MEDICAL INC]

## (undated) DEVICE — SKN PREP SPNG STKS PVP PNT STR: Brand: MEDLINE INDUSTRIES, INC.

## (undated) DEVICE — BENTSON WIRE GUIDE 20CM DISTAL FLEXIBILITY WITH SOFTENED TIP: Brand: BENTSON

## (undated) DEVICE — TUBING, SUCTION, 1/4" X 12', STRAIGHT: Brand: MEDLINE